# Patient Record
Sex: MALE | Race: BLACK OR AFRICAN AMERICAN | NOT HISPANIC OR LATINO | Employment: FULL TIME | URBAN - METROPOLITAN AREA
[De-identification: names, ages, dates, MRNs, and addresses within clinical notes are randomized per-mention and may not be internally consistent; named-entity substitution may affect disease eponyms.]

---

## 2019-09-25 ENCOUNTER — OFFICE VISIT (OUTPATIENT)
Dept: FAMILY MEDICINE CLINIC | Facility: CLINIC | Age: 44
End: 2019-09-25

## 2019-09-25 VITALS
BODY MASS INDEX: 26.86 KG/M2 | TEMPERATURE: 97.4 F | RESPIRATION RATE: 14 BRPM | WEIGHT: 216 LBS | HEART RATE: 82 BPM | HEIGHT: 75 IN | SYSTOLIC BLOOD PRESSURE: 128 MMHG | OXYGEN SATURATION: 98 % | DIASTOLIC BLOOD PRESSURE: 72 MMHG

## 2019-09-25 DIAGNOSIS — Z00.00 ANNUAL PHYSICAL EXAM: Primary | ICD-10-CM

## 2019-09-25 LAB
BILIRUB BLD-MCNC: NEGATIVE MG/DL
CLARITY, POC: CLEAR
COLOR UR: YELLOW
GLUCOSE UR STRIP-MCNC: NEGATIVE MG/DL
KETONES UR QL: NEGATIVE
LEUKOCYTE EST, POC: NEGATIVE
NITRITE UR-MCNC: NEGATIVE MG/ML
PH UR: 7 [PH] (ref 5–8)
PROT UR STRIP-MCNC: ABNORMAL MG/DL
RBC # UR STRIP: NEGATIVE /UL
SP GR UR: 1.2 (ref 1–1.03)
UROBILINOGEN UR QL: NORMAL

## 2019-09-25 PROCEDURE — 90471 IMMUNIZATION ADMIN: CPT | Performed by: INTERNAL MEDICINE

## 2019-09-25 PROCEDURE — 81003 URINALYSIS AUTO W/O SCOPE: CPT | Performed by: INTERNAL MEDICINE

## 2019-09-25 PROCEDURE — 99396 PREV VISIT EST AGE 40-64: CPT | Performed by: INTERNAL MEDICINE

## 2019-09-25 PROCEDURE — 93000 ELECTROCARDIOGRAM COMPLETE: CPT | Performed by: INTERNAL MEDICINE

## 2019-09-25 PROCEDURE — 90674 CCIIV4 VAC NO PRSV 0.5 ML IM: CPT | Performed by: INTERNAL MEDICINE

## 2019-09-25 RX ORDER — MONTELUKAST SODIUM 10 MG/1
TABLET ORAL
Refills: 2 | COMMUNITY
Start: 2019-09-11 | End: 2021-08-10

## 2019-09-25 RX ORDER — ALBUTEROL SULFATE 90 UG/1
AEROSOL, METERED RESPIRATORY (INHALATION)
Refills: 0 | COMMUNITY
Start: 2019-09-12 | End: 2021-08-10

## 2019-09-25 NOTE — PROGRESS NOTES
BÁRBARAStar Messina is a 44 y.o. male.     Chief Complaint   Patient presents with   • Annual Exam   • Hypertension       History of Present Illness   Patient here for annual physical.  Not on blood pressure medications.  Blood pressure has been under control.  He had asthma but under control not taking any medication except for albuterol as needed.  He had nasal polyps had surgeries.  No chest pain shortness of breath.  No additional family problems.  No cancer runs in the family.  No melena, hematemesis hematochezia.  No particular reason that he wanted to get a physical done.  The following portions of the patient's history were reviewed and updated as appropriate: allergies, current medications, past family history, past medical history, past social history, past surgical history and problem list.    Review of Systems   Constitutional: Negative.    HENT: Negative.    Eyes: Negative.    Respiratory: Negative.    Cardiovascular: Negative.    Gastrointestinal: Negative.    Endocrine: Negative.    Genitourinary: Negative.    Musculoskeletal: Negative.    Skin: Negative.    Allergic/Immunologic: Negative.    Neurological: Negative.    Hematological: Negative.    Psychiatric/Behavioral: Negative.        No Known Allergies    Current Outpatient Medications on File Prior to Visit   Medication Sig Dispense Refill   • albuterol sulfate  (90 Base) MCG/ACT inhaler INL TWO PFS PO Q 6 H PRF WHZ  0   • BREO ELLIPTA 100-25 MCG/INH inhaler INL ONE PUFF QD  0   • montelukast (SINGULAIR) 10 MG tablet TK ONE T PO ATN  2     No current facility-administered medications on file prior to visit.        History reviewed. No pertinent family history.    Past Medical History:   Diagnosis Date   • Asthma        Past Surgical History:   Procedure Laterality Date   • SINUS SURGERY         Social History     Socioeconomic History   • Marital status:      Spouse name: Not on file   • Number of children: Not on file   •  Years of education: Not on file   • Highest education level: Not on file   Tobacco Use   • Smoking status: Never Smoker   • Smokeless tobacco: Never Used   Substance and Sexual Activity   • Alcohol use: No     Frequency: Never   • Drug use: No   • Sexual activity: Defer       There is no problem list on file for this patient.      Vitals:    09/25/19 0939   BP: 128/72   Pulse: 82   Resp: 14   Temp: 97.4 °F (36.3 °C)   SpO2: 98%       Objective   Physical Exam   Constitutional: He is oriented to person, place, and time. He appears well-developed and well-nourished. No distress.   HENT:   Head: Normocephalic and atraumatic.   Right Ear: External ear normal.   Left Ear: External ear normal.   Mouth/Throat: Oropharynx is clear and moist.   Eyes: Conjunctivae and EOM are normal. Pupils are equal, round, and reactive to light.   Neck: Normal range of motion. Neck supple. No JVD present. No tracheal deviation present. No thyromegaly present.   Cardiovascular: Normal rate, regular rhythm, normal heart sounds and intact distal pulses. Exam reveals no gallop and no friction rub.   No murmur heard.  Pulmonary/Chest: Effort normal and breath sounds normal. No stridor. No respiratory distress. He has no wheezes. He has no rales. He exhibits no tenderness.   Abdominal: Soft. Bowel sounds are normal. He exhibits no distension and no mass. There is no tenderness. There is no rebound and no guarding. No hernia.   Genitourinary: Rectum normal, prostate normal and penis normal. Rectal exam shows guaiac negative stool.   Genitourinary Comments: Prostate was nontender, not enlarged.  Heme-negative.  No palpable inguinal hernia or testicular masses.   Musculoskeletal: Normal range of motion. He exhibits no edema, tenderness or deformity.   Lymphadenopathy:     He has no cervical adenopathy.   Neurological: He is alert and oriented to person, place, and time. He displays normal reflexes. No cranial nerve deficit or sensory deficit. He  exhibits normal muscle tone. Coordination normal.   Cranial nerves II through XII grossly intact, DTR 2+, motor 5/5   Skin: Skin is warm and dry. No rash noted. He is not diaphoretic. No erythema. No pallor.   Psychiatric: He has a normal mood and affect. His behavior is normal. Judgment and thought content normal.   Nursing note and vitals reviewed.        Assessment/Plan   Hope was seen today for annual exam and hypertension.    Diagnoses and all orders for this visit:    Annual physical exam  -     CBC & Differential  -     Comprehensive Metabolic Panel  -     Lipid Panel With / Chol / HDL Ratio  -     TSH  -     PSA Screen  -     ECG 12 Lead  -     POC Urinalysis Dipstick, Automated    Other orders  -     Flucelvax Quad=>4Years ([id number])    Continue diet and exercise.  Continue checking blood pressure.  EKG was normal.  Was normal sinus rhythm no ST elevation depression.  No arrhythmia found.  Normal EKG.  His urine is also normal.  Return as needed.

## 2019-09-26 LAB
ALBUMIN SERPL-MCNC: 4.6 G/DL (ref 3.5–5.2)
ALBUMIN/GLOB SERPL: 1.2 G/DL
ALP SERPL-CCNC: 66 U/L (ref 39–117)
ALT SERPL-CCNC: 21 U/L (ref 1–41)
AST SERPL-CCNC: 18 U/L (ref 1–40)
BASOPHILS # BLD AUTO: 0.09 10*3/MM3 (ref 0–0.2)
BASOPHILS NFR BLD AUTO: 1.6 % (ref 0–1.5)
BILIRUB SERPL-MCNC: 0.6 MG/DL (ref 0.2–1.2)
BUN SERPL-MCNC: 12 MG/DL (ref 6–20)
BUN/CREAT SERPL: 12.1 (ref 7–25)
CALCIUM SERPL-MCNC: 9.6 MG/DL (ref 8.6–10.5)
CHLORIDE SERPL-SCNC: 101 MMOL/L (ref 98–107)
CHOLEST SERPL-MCNC: 193 MG/DL (ref 0–200)
CHOLEST/HDLC SERPL: 4.95 {RATIO}
CO2 SERPL-SCNC: 23.6 MMOL/L (ref 22–29)
CREAT SERPL-MCNC: 0.99 MG/DL (ref 0.76–1.27)
EOSINOPHIL # BLD AUTO: 0.86 10*3/MM3 (ref 0–0.4)
EOSINOPHIL NFR BLD AUTO: 15 % (ref 0.3–6.2)
ERYTHROCYTE [DISTWIDTH] IN BLOOD BY AUTOMATED COUNT: 13.2 % (ref 12.3–15.4)
GLOBULIN SER CALC-MCNC: 3.8 GM/DL
GLUCOSE SERPL-MCNC: 91 MG/DL (ref 65–99)
HCT VFR BLD AUTO: 51 % (ref 37.5–51)
HDLC SERPL-MCNC: 39 MG/DL (ref 40–60)
HGB BLD-MCNC: 16.1 G/DL (ref 13–17.7)
IMM GRANULOCYTES # BLD AUTO: 0 10*3/MM3 (ref 0–0.05)
IMM GRANULOCYTES NFR BLD AUTO: 0 % (ref 0–0.5)
LDLC SERPL CALC-MCNC: 129 MG/DL (ref 0–100)
LYMPHOCYTES # BLD AUTO: 2.58 10*3/MM3 (ref 0.7–3.1)
LYMPHOCYTES NFR BLD AUTO: 45.1 % (ref 19.6–45.3)
MCH RBC QN AUTO: 27.2 PG (ref 26.6–33)
MCHC RBC AUTO-ENTMCNC: 31.6 G/DL (ref 31.5–35.7)
MCV RBC AUTO: 86.3 FL (ref 79–97)
MONOCYTES # BLD AUTO: 0.49 10*3/MM3 (ref 0.1–0.9)
MONOCYTES NFR BLD AUTO: 8.6 % (ref 5–12)
NEUTROPHILS # BLD AUTO: 1.7 10*3/MM3 (ref 1.7–7)
NEUTROPHILS NFR BLD AUTO: 29.7 % (ref 42.7–76)
NRBC BLD AUTO-RTO: 0 /100 WBC (ref 0–0.2)
PLATELET # BLD AUTO: 372 10*3/MM3 (ref 140–450)
POTASSIUM SERPL-SCNC: 4.3 MMOL/L (ref 3.5–5.2)
PROT SERPL-MCNC: 8.4 G/DL (ref 6–8.5)
PSA SERPL-MCNC: 1.26 NG/ML (ref 0–4)
RBC # BLD AUTO: 5.91 10*6/MM3 (ref 4.14–5.8)
SODIUM SERPL-SCNC: 140 MMOL/L (ref 136–145)
TRIGL SERPL-MCNC: 127 MG/DL (ref 0–150)
TSH SERPL DL<=0.005 MIU/L-ACNC: 0.97 UIU/ML (ref 0.27–4.2)
VLDLC SERPL CALC-MCNC: 25.4 MG/DL
WBC # BLD AUTO: 5.72 10*3/MM3 (ref 3.4–10.8)

## 2020-12-09 DIAGNOSIS — T78.40XA ALLERGY, INITIAL ENCOUNTER: ICD-10-CM

## 2020-12-09 DIAGNOSIS — R09.81 NASAL CONGESTION: ICD-10-CM

## 2020-12-09 DIAGNOSIS — J33.9 NASAL POLYP: Primary | ICD-10-CM

## 2021-08-10 ENCOUNTER — OFFICE VISIT (OUTPATIENT)
Dept: FAMILY MEDICINE CLINIC | Facility: CLINIC | Age: 46
End: 2021-08-10

## 2021-08-10 ENCOUNTER — TELEPHONE (OUTPATIENT)
Dept: GASTROENTEROLOGY | Facility: CLINIC | Age: 46
End: 2021-08-10

## 2021-08-10 VITALS
TEMPERATURE: 98 F | OXYGEN SATURATION: 99 % | BODY MASS INDEX: 27.59 KG/M2 | DIASTOLIC BLOOD PRESSURE: 70 MMHG | WEIGHT: 215 LBS | RESPIRATION RATE: 16 BRPM | HEIGHT: 74 IN | HEART RATE: 74 BPM | SYSTOLIC BLOOD PRESSURE: 118 MMHG

## 2021-08-10 DIAGNOSIS — Z12.11 COLON CANCER SCREENING: Primary | ICD-10-CM

## 2021-08-10 DIAGNOSIS — Z12.5 PROSTATE CANCER SCREENING: ICD-10-CM

## 2021-08-10 DIAGNOSIS — Z00.00 ANNUAL PHYSICAL EXAM: ICD-10-CM

## 2021-08-10 LAB
BILIRUB BLD-MCNC: NEGATIVE MG/DL
CLARITY, POC: CLEAR
COLOR UR: YELLOW
GLUCOSE UR STRIP-MCNC: NEGATIVE MG/DL
KETONES UR QL: NEGATIVE
LEUKOCYTE EST, POC: NEGATIVE
NITRITE UR-MCNC: NEGATIVE MG/ML
PH UR: 7 [PH] (ref 5–8)
PROT UR STRIP-MCNC: NEGATIVE MG/DL
RBC # UR STRIP: NEGATIVE /UL
SP GR UR: 1.01 (ref 1–1.03)
UROBILINOGEN UR QL: NORMAL

## 2021-08-10 PROCEDURE — 93000 ELECTROCARDIOGRAM COMPLETE: CPT | Performed by: INTERNAL MEDICINE

## 2021-08-10 PROCEDURE — 99396 PREV VISIT EST AGE 40-64: CPT | Performed by: INTERNAL MEDICINE

## 2021-08-10 PROCEDURE — 81003 URINALYSIS AUTO W/O SCOPE: CPT | Performed by: INTERNAL MEDICINE

## 2021-08-10 NOTE — PROGRESS NOTES
Shira Messina is a 46 y.o. male.     Chief Complaint   Patient presents with   • Annual Exam     physical        History of Present Illness   Patient here for annual physical.  He is seeing Dr. Brooks for asthma now he is on Advair as insurance not covering for Breo Ellipta.  Denies any chest pain shortness of breath.  No weight loss.  The following portions of the patient's history were reviewed and updated as appropriate: allergies, current medications, past family history, past medical history, past social history, past surgical history and problem list.    Review of Systems   Constitutional: Negative for activity change, appetite change, fatigue and fever.   Eyes: Negative for blurred vision and double vision.   Respiratory: Negative for shortness of breath.    Cardiovascular: Negative for chest pain, palpitations and leg swelling.   Neurological: Negative for dizziness, syncope, light-headedness and headache.   All other systems reviewed and are negative.      No Known Allergies    Current Outpatient Medications on File Prior to Visit   Medication Sig Dispense Refill   • BREO ELLIPTA 100-25 MCG/INH inhaler INL ONE PUFF QD  0   • [DISCONTINUED] albuterol sulfate  (90 Base) MCG/ACT inhaler INL TWO PFS PO Q 6 H PRF WHZ  0   • [DISCONTINUED] montelukast (SINGULAIR) 10 MG tablet TK ONE T PO ATN  2     No current facility-administered medications on file prior to visit.       History reviewed. No pertinent family history.    Past Medical History:   Diagnosis Date   • Asthma        Past Surgical History:   Procedure Laterality Date   • SINUS SURGERY         Social History     Socioeconomic History   • Marital status:      Spouse name: Not on file   • Number of children: Not on file   • Years of education: Not on file   • Highest education level: Not on file   Tobacco Use   • Smoking status: Never Smoker   • Smokeless tobacco: Never Used   Substance and Sexual Activity   • Alcohol use: No   •  "Drug use: No   • Sexual activity: Defer       Patient Active Problem List   Diagnosis   • Asthma with acute exacerbation       /70   Pulse 74   Temp 98 °F (36.7 °C)   Resp 16   Ht 188 cm (74\")   Wt 97.5 kg (215 lb)   SpO2 99%   BMI 27.60 kg/m²   Body mass index is 27.6 kg/m².    Objective   Physical Exam  Constitutional:       General: He is not in acute distress.     Appearance: He is well-developed. He is not diaphoretic.   HENT:      Head: Normocephalic and atraumatic.      Right Ear: External ear normal.      Left Ear: External ear normal.   Eyes:      Conjunctiva/sclera: Conjunctivae normal.      Pupils: Pupils are equal, round, and reactive to light.   Neck:      Thyroid: No thyromegaly.      Vascular: No JVD.      Trachea: No tracheal deviation.   Cardiovascular:      Rate and Rhythm: Normal rate and regular rhythm.      Heart sounds: Normal heart sounds. No murmur heard.   No friction rub. No gallop.    Pulmonary:      Effort: Pulmonary effort is normal. No respiratory distress.      Breath sounds: Normal breath sounds. No stridor. No wheezing or rales.   Chest:      Chest wall: No tenderness.   Abdominal:      General: Bowel sounds are normal. There is no distension.      Palpations: Abdomen is soft. There is no mass.      Tenderness: There is no abdominal tenderness. There is no guarding or rebound.      Hernia: No hernia is present.   Genitourinary:     Penis: Normal.       Testes: Normal.      Prostate: Normal.      Rectum: Guaiac result negative.   Musculoskeletal:         General: No tenderness or deformity. Normal range of motion.      Cervical back: Normal range of motion and neck supple.   Lymphadenopathy:      Cervical: No cervical adenopathy.   Skin:     General: Skin is warm and dry.      Coloration: Skin is not pale.      Findings: No erythema or rash.   Neurological:      General: No focal deficit present.      Mental Status: He is alert and oriented to person, place, and time. " Mental status is at baseline.      Cranial Nerves: No cranial nerve deficit.      Sensory: No sensory deficit.      Motor: No weakness.      Coordination: Coordination normal.      Gait: Gait normal.      Deep Tendon Reflexes: Reflexes normal.      Comments: Cranial nerves II through XII grossly intact, DTR 2+, motor 5/5   Psychiatric:         Mood and Affect: Mood normal.         Behavior: Behavior normal.         Thought Content: Thought content normal.         Judgment: Judgment normal.           Assessment/Plan   Diagnoses and all orders for this visit:    1. Colon cancer screening (Primary)  -     Ambulatory Referral For Screening Colonoscopy  -     Comprehensive Metabolic Panel  -     CBC & Differential  -     Lipid Panel With / Chol / HDL Ratio  -     TSH  -     POC Urinalysis Dipstick, Automated  -     ECG 12 Lead    2. Annual physical exam  -     Comprehensive Metabolic Panel  -     CBC & Differential  -     Lipid Panel With / Chol / HDL Ratio  -     TSH  -     POC Urinalysis Dipstick, Automated  -     ECG 12 Lead  -     PSA Screen  -     Hepatitis C Antibody    3. Prostate cancer screening  -     PSA Screen    Discussed with patient diet and exercise.  Check blood pressure at home.  Colonoscopy.  Recommend getting routine vaccinations for shingles, tetanus.  His EKG is normal sinus rhythm no ST elevation depression, normal EKG.  Awaiting labs.  Patient to return as needed.

## 2021-08-11 LAB
ALBUMIN SERPL-MCNC: 4.5 G/DL (ref 3.5–5.2)
ALBUMIN/GLOB SERPL: 1.3 G/DL
ALP SERPL-CCNC: 66 U/L (ref 39–117)
ALT SERPL-CCNC: 22 U/L (ref 1–41)
AST SERPL-CCNC: 21 U/L (ref 1–40)
BASOPHILS # BLD AUTO: 0.08 10*3/MM3 (ref 0–0.2)
BASOPHILS NFR BLD AUTO: 1.4 % (ref 0–1.5)
BILIRUB SERPL-MCNC: 0.6 MG/DL (ref 0–1.2)
BUN SERPL-MCNC: 9 MG/DL (ref 6–20)
BUN/CREAT SERPL: 11 (ref 7–25)
CALCIUM SERPL-MCNC: 9.5 MG/DL (ref 8.6–10.5)
CHLORIDE SERPL-SCNC: 101 MMOL/L (ref 98–107)
CHOLEST SERPL-MCNC: 160 MG/DL (ref 0–200)
CHOLEST/HDLC SERPL: 4.21 {RATIO}
CO2 SERPL-SCNC: 24 MMOL/L (ref 22–29)
CREAT SERPL-MCNC: 0.82 MG/DL (ref 0.76–1.27)
EOSINOPHIL # BLD AUTO: 0.8 10*3/MM3 (ref 0–0.4)
EOSINOPHIL NFR BLD AUTO: 13.8 % (ref 0.3–6.2)
ERYTHROCYTE [DISTWIDTH] IN BLOOD BY AUTOMATED COUNT: 12.9 % (ref 12.3–15.4)
GLOBULIN SER CALC-MCNC: 3.5 GM/DL
GLUCOSE SERPL-MCNC: 86 MG/DL (ref 65–99)
HCT VFR BLD AUTO: 47.4 % (ref 37.5–51)
HCV AB S/CO SERPL IA: <0.1 S/CO RATIO (ref 0–0.9)
HDLC SERPL-MCNC: 38 MG/DL (ref 40–60)
HGB BLD-MCNC: 15.5 G/DL (ref 13–17.7)
IMM GRANULOCYTES # BLD AUTO: 0 10*3/MM3 (ref 0–0.05)
IMM GRANULOCYTES NFR BLD AUTO: 0 % (ref 0–0.5)
LDLC SERPL CALC-MCNC: 100 MG/DL (ref 0–100)
LYMPHOCYTES # BLD AUTO: 2.92 10*3/MM3 (ref 0.7–3.1)
LYMPHOCYTES NFR BLD AUTO: 50.3 % (ref 19.6–45.3)
MCH RBC QN AUTO: 28.3 PG (ref 26.6–33)
MCHC RBC AUTO-ENTMCNC: 32.7 G/DL (ref 31.5–35.7)
MCV RBC AUTO: 86.7 FL (ref 79–97)
MONOCYTES # BLD AUTO: 0.58 10*3/MM3 (ref 0.1–0.9)
MONOCYTES NFR BLD AUTO: 10 % (ref 5–12)
NEUTROPHILS # BLD AUTO: 1.43 10*3/MM3 (ref 1.7–7)
NEUTROPHILS NFR BLD AUTO: 24.5 % (ref 42.7–76)
NRBC BLD AUTO-RTO: 0 /100 WBC (ref 0–0.2)
PLATELET # BLD AUTO: 372 10*3/MM3 (ref 140–450)
POTASSIUM SERPL-SCNC: 4.3 MMOL/L (ref 3.5–5.2)
PROT SERPL-MCNC: 8 G/DL (ref 6–8.5)
PSA SERPL-MCNC: 0.95 NG/ML (ref 0–4)
RBC # BLD AUTO: 5.47 10*6/MM3 (ref 4.14–5.8)
SODIUM SERPL-SCNC: 137 MMOL/L (ref 136–145)
TRIGL SERPL-MCNC: 121 MG/DL (ref 0–150)
TSH SERPL DL<=0.005 MIU/L-ACNC: 0.98 UIU/ML (ref 0.27–4.2)
VLDLC SERPL CALC-MCNC: 22 MG/DL (ref 5–40)
WBC # BLD AUTO: 5.81 10*3/MM3 (ref 3.4–10.8)

## 2021-12-17 ENCOUNTER — IMMUNIZATION (OUTPATIENT)
Dept: VACCINE CLINIC | Facility: HOSPITAL | Age: 46
End: 2021-12-17

## 2021-12-17 PROCEDURE — 91300 HC SARSCOV02 VAC 30MCG/0.3ML IM: CPT | Performed by: INTERNAL MEDICINE

## 2021-12-17 PROCEDURE — 0004A HC ADM SARSCOV2 30MCG/0.3ML BOOSTER: CPT | Performed by: INTERNAL MEDICINE

## 2023-08-01 ENCOUNTER — TELEPHONE (OUTPATIENT)
Dept: GASTROENTEROLOGY | Facility: CLINIC | Age: 48
End: 2023-08-01
Payer: MEDICAID

## 2023-08-01 ENCOUNTER — HOSPITAL ENCOUNTER (OUTPATIENT)
Dept: CT IMAGING | Facility: HOSPITAL | Age: 48
Discharge: HOME | End: 2023-08-01
Admitting: INTERNAL MEDICINE
Payer: MEDICAID

## 2023-08-01 DIAGNOSIS — R31.9 HEMATURIA, UNSPECIFIED TYPE: ICD-10-CM

## 2023-08-01 PROCEDURE — 74176 CT ABD & PELVIS W/O CONTRAST: CPT

## 2023-08-07 ENCOUNTER — OFFICE VISIT (OUTPATIENT)
Dept: FAMILY MEDICINE CLINIC | Facility: CLINIC | Age: 48
End: 2023-08-07
Payer: MEDICAID

## 2023-08-07 VITALS
HEIGHT: 74 IN | HEART RATE: 69 BPM | DIASTOLIC BLOOD PRESSURE: 84 MMHG | BODY MASS INDEX: 28.23 KG/M2 | OXYGEN SATURATION: 97 % | RESPIRATION RATE: 16 BRPM | TEMPERATURE: 98 F | WEIGHT: 220 LBS | SYSTOLIC BLOOD PRESSURE: 124 MMHG

## 2023-08-07 DIAGNOSIS — R31.9 HEMATURIA, UNSPECIFIED TYPE: Primary | ICD-10-CM

## 2023-08-07 PROBLEM — J45.901 MODERATE ASTHMA WITH ACUTE EXACERBATION: Status: ACTIVE | Noted: 2023-08-07

## 2023-08-07 LAB
BILIRUB BLD-MCNC: NEGATIVE MG/DL
CLARITY, POC: CLEAR
COLOR UR: YELLOW
EXPIRATION DATE: ABNORMAL
GLUCOSE UR STRIP-MCNC: NEGATIVE MG/DL
KETONES UR QL: NEGATIVE
LEUKOCYTE EST, POC: NEGATIVE
Lab: ABNORMAL
NITRITE UR-MCNC: NEGATIVE MG/ML
PH UR: 7 [PH] (ref 5–8)
PROT UR STRIP-MCNC: ABNORMAL MG/DL
RBC # UR STRIP: ABNORMAL /UL
SP GR UR: 1.01 (ref 1–1.03)
UROBILINOGEN UR QL: ABNORMAL

## 2023-08-07 NOTE — PROGRESS NOTES
Shira Messina is a 48 y.o. male.     Chief Complaint   Patient presents with    Follow-up     Hematuria  History of Present Illness   No chest pain shortness of breath.  No abdominal pain.  Follow-up for hematuria.  No dysuria.  The following portions of the patient's history were reviewed and updated as appropriate: allergies, current medications, past family history, past medical history, past social history, past surgical history and problem list.    Review of Systems   Constitutional:  Negative for activity change, appetite change, fatigue and fever.   Eyes:  Negative for blurred vision and double vision.   Respiratory: Negative.  Negative for shortness of breath.    Cardiovascular:  Negative for chest pain, palpitations and leg swelling.   Gastrointestinal: Negative.    Genitourinary:  Negative for dysuria, flank pain, frequency, hematuria and nocturia.   Neurological:  Negative for dizziness, syncope, light-headedness and headache.     No Known Allergies    Current Outpatient Medications on File Prior to Visit   Medication Sig Dispense Refill    BREO ELLIPTA 100-25 MCG/INH inhaler   0    Dupixent 300 MG/2ML solution prefilled syringe       fluticasone (FLONASE) 50 MCG/ACT nasal spray       fluticasone-salmeterol (ADVAIR HFA) 45-21 MCG/ACT inhaler Inhale 2 puffs 2 (Two) Times a Day.       No current facility-administered medications on file prior to visit.       History reviewed. No pertinent family history.    Past Medical History:   Diagnosis Date    Asthma        Past Surgical History:   Procedure Laterality Date    SINUS SURGERY         Social History     Socioeconomic History    Marital status:    Tobacco Use    Smoking status: Never    Smokeless tobacco: Never   Substance and Sexual Activity    Alcohol use: No    Drug use: No    Sexual activity: Defer       Patient Active Problem List   Diagnosis    Moderate asthma with acute exacerbation       /84 (BP Location: Left arm, Patient  "Position: Sitting, Cuff Size: Adult)   Pulse 69   Temp 98 øF (36.7 øC) (Temporal)   Resp 16   Ht 188 cm (74.02\")   Wt 99.8 kg (220 lb)   SpO2 97%   BMI 28.23 kg/mý   Body mass index is 28.23 kg/mý.    Objective   Physical Exam  Vitals and nursing note reviewed.   Constitutional:       Appearance: He is well-developed.   Neck:      Thyroid: No thyromegaly.      Vascular: No JVD.      Trachea: No tracheal deviation.   Cardiovascular:      Rate and Rhythm: Normal rate and regular rhythm.      Heart sounds: Normal heart sounds. No murmur heard.    No friction rub. No gallop.   Pulmonary:      Effort: Pulmonary effort is normal. No respiratory distress.      Breath sounds: Normal breath sounds. No wheezing.   Abdominal:      General: Bowel sounds are normal. There is no distension.      Palpations: Abdomen is soft. There is no mass.      Tenderness: There is no abdominal tenderness. There is no guarding or rebound.      Hernia: No hernia is present.   Musculoskeletal:         General: No swelling or tenderness. Normal range of motion.      Cervical back: Normal range of motion and neck supple.   Lymphadenopathy:      Cervical: No cervical adenopathy.   Neurological:      Mental Status: He is alert.         Assessment & Plan   Diagnoses and all orders for this visit:    1. Hematuria, unspecified type (Primary)  -     POC Urinalysis Dipstick, Automated    US showed no blood at this time.  CT of the abdomen pelvis normal, urine culture been negative.  Discussed with patient he has no symptoms, UA is normal at this time.  We will recheck in 3 months if urine has any blood we will refer to urology.  Regarding his off-and-on joint pains he relates to Dupixent he will check with his allergist.  I will see him back in 3 months.  EHR dragon/transcription disclaimer:  Part of this note are created by electronic transcription/translation of spoken language to printed text and thus may lead to erroneous, or at times, " nonsensical words or phrases inadvertently transcribed.  Although I have reviewed for such errors, some may still exist.

## 2023-09-15 DIAGNOSIS — Z12.11 ENCOUNTER FOR SCREENING FOR MALIGNANT NEOPLASM OF COLON: Primary | ICD-10-CM

## 2023-10-04 PROBLEM — Z12.11 ENCOUNTER FOR SCREENING FOR MALIGNANT NEOPLASM OF COLON: Status: ACTIVE | Noted: 2023-10-04

## 2023-10-06 ENCOUNTER — ANESTHESIA EVENT (OUTPATIENT)
Dept: PERIOP | Facility: HOSPITAL | Age: 48
End: 2023-10-06

## 2023-10-09 ENCOUNTER — HOSPITAL ENCOUNTER (OUTPATIENT)
Facility: HOSPITAL | Age: 48
Setting detail: HOSPITAL OUTPATIENT SURGERY
Discharge: HOME | End: 2023-10-09
Attending: STUDENT IN AN ORGANIZED HEALTH CARE EDUCATION/TRAINING PROGRAM | Admitting: NURSE ANESTHETIST, CERTIFIED REGISTERED
Payer: MEDICAID

## 2023-10-09 ENCOUNTER — ANESTHESIA (OUTPATIENT)
Dept: PERIOP | Facility: HOSPITAL | Age: 48
End: 2023-10-09

## 2023-10-09 VITALS — TEMPERATURE: 97.7 F | BODY MASS INDEX: 28.7 KG/M2 | WEIGHT: 223.6 LBS

## 2023-10-09 PROCEDURE — G0463 HOSPITAL OUTPT CLINIC VISIT: HCPCS | Performed by: STUDENT IN AN ORGANIZED HEALTH CARE EDUCATION/TRAINING PROGRAM

## 2023-10-09 RX ORDER — SODIUM CHLORIDE, SODIUM LACTATE, POTASSIUM CHLORIDE, CALCIUM CHLORIDE 600; 310; 30; 20 MG/100ML; MG/100ML; MG/100ML; MG/100ML
9 INJECTION, SOLUTION INTRAVENOUS CONTINUOUS PRN
Status: DISCONTINUED | OUTPATIENT
Start: 2023-10-09 | End: 2023-10-09 | Stop reason: HOSPADM

## 2023-10-09 RX ORDER — SODIUM CHLORIDE 0.9 % (FLUSH) 0.9 %
10 SYRINGE (ML) INJECTION EVERY 12 HOURS SCHEDULED
Status: DISCONTINUED | OUTPATIENT
Start: 2023-10-09 | End: 2023-10-09 | Stop reason: HOSPADM

## 2023-10-09 RX ORDER — SODIUM CHLORIDE 9 MG/ML
40 INJECTION, SOLUTION INTRAVENOUS AS NEEDED
Status: DISCONTINUED | OUTPATIENT
Start: 2023-10-09 | End: 2023-10-09 | Stop reason: HOSPADM

## 2023-10-09 RX ORDER — SODIUM CHLORIDE 0.9 % (FLUSH) 0.9 %
10 SYRINGE (ML) INJECTION AS NEEDED
Status: DISCONTINUED | OUTPATIENT
Start: 2023-10-09 | End: 2023-10-09 | Stop reason: HOSPADM

## 2023-10-09 NOTE — NURSING NOTE
Patient cancelled due to not prepping for procedure. Misunderstanding about prep  Dr. Yeboah here to speak to patient.

## 2023-10-10 DIAGNOSIS — Z12.11 ENCOUNTER FOR SCREENING FOR MALIGNANT NEOPLASM OF COLON: Primary | ICD-10-CM

## 2023-11-01 ENCOUNTER — ANESTHESIA EVENT (OUTPATIENT)
Dept: PERIOP | Facility: HOSPITAL | Age: 48
End: 2023-11-01
Payer: MEDICAID

## 2023-11-02 ENCOUNTER — ANESTHESIA (OUTPATIENT)
Dept: PERIOP | Facility: HOSPITAL | Age: 48
End: 2023-11-02
Payer: MEDICAID

## 2023-11-02 ENCOUNTER — HOSPITAL ENCOUNTER (OUTPATIENT)
Facility: HOSPITAL | Age: 48
Setting detail: HOSPITAL OUTPATIENT SURGERY
Discharge: HOME | End: 2023-11-02
Attending: STUDENT IN AN ORGANIZED HEALTH CARE EDUCATION/TRAINING PROGRAM | Admitting: STUDENT IN AN ORGANIZED HEALTH CARE EDUCATION/TRAINING PROGRAM
Payer: MEDICAID

## 2023-11-02 VITALS
TEMPERATURE: 97.5 F | WEIGHT: 223.8 LBS | OXYGEN SATURATION: 98 % | RESPIRATION RATE: 17 BRPM | DIASTOLIC BLOOD PRESSURE: 82 MMHG | SYSTOLIC BLOOD PRESSURE: 106 MMHG | HEART RATE: 70 BPM | BODY MASS INDEX: 28.72 KG/M2

## 2023-11-02 DIAGNOSIS — Z12.11 ENCOUNTER FOR SCREENING FOR MALIGNANT NEOPLASM OF COLON: ICD-10-CM

## 2023-11-02 PROCEDURE — 25010000002 PROPOFOL 200 MG/20ML EMULSION: Performed by: ANESTHESIOLOGY

## 2023-11-02 PROCEDURE — 25810000003 LACTATED RINGERS PER 1000 ML: Performed by: NURSE ANESTHETIST, CERTIFIED REGISTERED

## 2023-11-02 PROCEDURE — 45378 DIAGNOSTIC COLONOSCOPY: CPT | Performed by: STUDENT IN AN ORGANIZED HEALTH CARE EDUCATION/TRAINING PROGRAM

## 2023-11-02 RX ORDER — LIDOCAINE HYDROCHLORIDE 20 MG/ML
INJECTION, SOLUTION INFILTRATION; PERINEURAL AS NEEDED
Status: DISCONTINUED | OUTPATIENT
Start: 2023-11-02 | End: 2023-11-02 | Stop reason: SURG

## 2023-11-02 RX ORDER — PROPOFOL 10 MG/ML
INJECTION, EMULSION INTRAVENOUS AS NEEDED
Status: DISCONTINUED | OUTPATIENT
Start: 2023-11-02 | End: 2023-11-02 | Stop reason: SURG

## 2023-11-02 RX ORDER — SODIUM CHLORIDE, SODIUM LACTATE, POTASSIUM CHLORIDE, CALCIUM CHLORIDE 600; 310; 30; 20 MG/100ML; MG/100ML; MG/100ML; MG/100ML
9 INJECTION, SOLUTION INTRAVENOUS CONTINUOUS PRN
Status: DISCONTINUED | OUTPATIENT
Start: 2023-11-02 | End: 2023-11-02 | Stop reason: HOSPADM

## 2023-11-02 RX ORDER — SODIUM CHLORIDE 0.9 % (FLUSH) 0.9 %
10 SYRINGE (ML) INJECTION AS NEEDED
Status: DISCONTINUED | OUTPATIENT
Start: 2023-11-02 | End: 2023-11-02 | Stop reason: HOSPADM

## 2023-11-02 RX ORDER — SODIUM CHLORIDE 0.9 % (FLUSH) 0.9 %
10 SYRINGE (ML) INJECTION EVERY 12 HOURS SCHEDULED
Status: DISCONTINUED | OUTPATIENT
Start: 2023-11-02 | End: 2023-11-02 | Stop reason: HOSPADM

## 2023-11-02 RX ORDER — MAGNESIUM HYDROXIDE 1200 MG/15ML
LIQUID ORAL AS NEEDED
Status: DISCONTINUED | OUTPATIENT
Start: 2023-11-02 | End: 2023-11-02 | Stop reason: HOSPADM

## 2023-11-02 RX ORDER — SODIUM CHLORIDE 9 MG/ML
40 INJECTION, SOLUTION INTRAVENOUS AS NEEDED
Status: DISCONTINUED | OUTPATIENT
Start: 2023-11-02 | End: 2023-11-02 | Stop reason: HOSPADM

## 2023-11-02 RX ADMIN — LIDOCAINE HYDROCHLORIDE 100 MG: 20 INJECTION, SOLUTION INFILTRATION; PERINEURAL at 08:07

## 2023-11-02 RX ADMIN — SODIUM CHLORIDE, POTASSIUM CHLORIDE, SODIUM LACTATE AND CALCIUM CHLORIDE 9 ML/HR: 600; 310; 30; 20 INJECTION, SOLUTION INTRAVENOUS at 07:40

## 2023-11-02 RX ADMIN — PROPOFOL INJECTABLE EMULSION 250 MG: 10 INJECTION, EMULSION INTRAVENOUS at 08:07

## 2023-11-02 NOTE — ANESTHESIA PREPROCEDURE EVALUATION
Anesthesia Evaluation     no history of anesthetic complications:   NPO Solid Status: > 8 hours  NPO Liquid Status: > 8 hours           Airway   Mallampati: III  TM distance: >3 FB  Neck ROM: full  No difficulty expected  Dental - normal exam     Comment: Crown on bottom right in back (fixed)    Pulmonary - normal exam   (+) asthma (No inhaler use for 6 months),  Cardiovascular - negative cardio ROS and normal exam        Neuro/Psych  GI/Hepatic/Renal/Endo - negative ROS     Musculoskeletal (-) negative ROS    Abdominal    Substance History      OB/GYN          Other - negative ROS                         Anesthesia Plan    ASA 2     MAC     intravenous induction     Anesthetic plan, risks, benefits, and alternatives have been provided, discussed and informed consent has been obtained with: patient.  Pre-procedure education provided  Plan discussed with CRNA.        CODE STATUS:

## 2023-11-02 NOTE — ANESTHESIA POSTPROCEDURE EVALUATION
Patient: Hope Messina    Procedure Summary       Date: 11/02/23 Room / Location: [facility] ENDOSCOPY 1 / [facility] OR    Anesthesia Start: 0801 Anesthesia Stop: 0824    Procedure: COLONOSCOPY Diagnosis:       Encounter for screening for malignant neoplasm of colon      (Encounter for screening for malignant neoplasm of colon [Z12.11])    Surgeons: Abdoul Yeboah MD Provider: Tara He MD    Anesthesia Type: MAC ASA Status: 2            Anesthesia Type: MAC    Vitals  Vitals Value Taken Time   /74 11/02/23 0856   Temp 97.5 °F (36.4 °C) 11/02/23 0840   Pulse 76 11/02/23 0900   Resp 15 11/02/23 0840   SpO2 96 % 11/02/23 0900   Vitals shown include unfiled device data.        Post Anesthesia Care and Evaluation    Patient location during evaluation: PHASE II  Patient participation: complete - patient participated  Level of consciousness: awake and alert  Pain score: 0  Pain management: adequate    Airway patency: patent  Anesthetic complications: No anesthetic complications  PONV Status: none  Cardiovascular status: acceptable  Respiratory status: acceptable  Hydration status: acceptable

## 2023-11-02 NOTE — H&P
Patient Care Team:  Niya Marcum MD as PCP - General (Internal Medicine)    CHIEF COMPLAINT: Screening CRC    HISTORY OF PRESENT ILLNESS:  For average risk screening (1st c/s).     Past Medical History:   Diagnosis Date    Asthma      Past Surgical History:   Procedure Laterality Date    HAMMER TOE REPAIR      SINUS SURGERY       Family History   Problem Relation Age of Onset    Malig Hyperthermia Neg Hx      Social History     Tobacco Use    Smoking status: Never    Smokeless tobacco: Never   Vaping Use    Vaping Use: Never used   Substance Use Topics    Alcohol use: No    Drug use: No     Medications Prior to Admission   Medication Sig Dispense Refill Last Dose    Dupixent 300 MG/2ML solution prefilled syringe    Past Month    fluticasone (FLONASE) 50 MCG/ACT nasal spray    Past Month    fluticasone-salmeterol (ADVAIR HFA) 45-21 MCG/ACT inhaler Inhale 2 puffs 2 (Two) Times a Day.   More than a month     Allergies:  Patient has no known allergies.    REVIEW OF SYSTEMS:  Please see the above history of present illness for pertinent positives and negatives.  The remainder of the patient's systems have been reviewed and are negative.     Vital Signs  Temp:  [97.8 °F (36.6 °C)] 97.8 °F (36.6 °C)  Heart Rate:  [71] 71  Resp:  [10] 10  BP: (123)/(75) 123/75    Flowsheet Rows      Flowsheet Row First Filed Value   Admission Height --   Admission Weight 102 kg (223 lb 12.8 oz) Documented at 11/02/2023 0727             Physical Exam:  Physical Exam   Constitutional: Patient appears well-developed and well-nourished and in no acute distress   HEENT:   Head: Normocephalic and atraumatic.   Eyes:  Pupils are equal, round, and reactive to light. EOM are intact. Sclerae are anicteric and non-injected.  Mouth and Throat: Patient has moist mucous membranes. Oropharynx is clear of any erythema or exudate.     Neck: Neck supple. No JVD present. No thyromegaly present. No lymphadenopathy present.  Cardiovascular: Regular rate,  regular rhythm, S1 normal and S2 normal.  Exam reveals no gallop and no friction rub.  No murmur heard.  Pulmonary/Chest: Lungs are clear to auscultation bilaterally. No respiratory distress. No wheezes. No rhonchi. No rales.   Abdominal: Soft. Bowel sounds are normal. No distension and no mass. There is no hepatosplenomegaly. There is no tenderness.   Musculoskeletal: Normal Muscle tone  Extremities: No edema. Pulses are palpable in all 4 extremities.  Neurological: Patient is alert and oriented to person, place, and time. Cranial nerves II-XII are grossly intact with no focal deficits.  Skin: Skin is warm. No rash noted. Nails show no clubbing.  No cyanosis or erythema.    Debilities/Disabilities Identified: None  Emotional Behavior: Appropriate     Results Review:   I reviewed the patient's new clinical results.    Lab Results (most recent)       None            Imaging Results (Most Recent)       None          reviewed    ECG/EMG Results (most recent)       None          reviewed    Assessment & Plan   Screening CRC/  colonoscopy      I discussed the patient's findings and my recommendations with patient.     Abdoul Yeboah MD  11/02/23  08:08 EDT    Time: 10 min prior to procedure.

## 2023-11-13 ENCOUNTER — OFFICE VISIT (OUTPATIENT)
Dept: FAMILY MEDICINE CLINIC | Facility: CLINIC | Age: 48
End: 2023-11-13
Payer: MEDICAID

## 2023-11-13 VITALS
RESPIRATION RATE: 17 BRPM | WEIGHT: 230 LBS | HEART RATE: 95 BPM | HEIGHT: 74 IN | OXYGEN SATURATION: 97 % | TEMPERATURE: 98.4 F | DIASTOLIC BLOOD PRESSURE: 64 MMHG | SYSTOLIC BLOOD PRESSURE: 118 MMHG | BODY MASS INDEX: 29.52 KG/M2

## 2023-11-13 DIAGNOSIS — J45.901 MODERATE ASTHMA WITH ACUTE EXACERBATION, UNSPECIFIED WHETHER PERSISTENT: ICD-10-CM

## 2023-11-13 DIAGNOSIS — R31.9 HEMATURIA, UNSPECIFIED TYPE: Primary | ICD-10-CM

## 2023-11-13 LAB
BILIRUB BLD-MCNC: NEGATIVE MG/DL
CLARITY, POC: CLEAR
COLOR UR: YELLOW
EXPIRATION DATE: ABNORMAL
GLUCOSE UR STRIP-MCNC: NEGATIVE MG/DL
KETONES UR QL: NEGATIVE
LEUKOCYTE EST, POC: NEGATIVE
Lab: ABNORMAL
NITRITE UR-MCNC: NEGATIVE MG/ML
PH UR: 5.5 [PH] (ref 5–8)
PROT UR STRIP-MCNC: ABNORMAL MG/DL
RBC # UR STRIP: NEGATIVE /UL
SP GR UR: 1.03 (ref 1–1.03)
UROBILINOGEN UR QL: NORMAL

## 2023-11-13 RX ORDER — FLUTICASONE PROPIONATE AND SALMETEROL 50; 250 UG/1; UG/1
POWDER RESPIRATORY (INHALATION)
COMMUNITY
Start: 2023-11-03

## 2023-11-13 NOTE — PROGRESS NOTES
Shira Messina is a 48 y.o. male.     Chief Complaint   Patient presents with    Asthma   Hematuria    Asthma  There is no shortness of breath. Pertinent negatives include no appetite change, chest pain or fever. His past medical history is significant for asthma.   Patient here also follow-up for hematuria.  He has no dysuria, urgency, visible blood.  He had a CT abdomen pelvis without contrast that was normal.  UA today was normal.  Patient is aware if has recurrent problems he may have to be seen by a urologist.    He sees I believe pulmonologist for his asthma.    The following portions of the patient's history were reviewed and updated as appropriate: allergies, current medications, past family history, past medical history, past social history, past surgical history and problem list.    Review of Systems   Constitutional:  Negative for activity change, appetite change, fatigue and fever.   Eyes:  Negative for blurred vision and double vision.   Respiratory: Negative.  Negative for shortness of breath.    Cardiovascular:  Negative for chest pain, palpitations and leg swelling.   Gastrointestinal: Negative.    Neurological:  Negative for dizziness, syncope, light-headedness and headache.       No Known Allergies    Current Outpatient Medications on File Prior to Visit   Medication Sig Dispense Refill    Advair Diskus 250-50 MCG/ACT DISKUS       Dupixent 300 MG/2ML solution prefilled syringe       fluticasone (FLONASE) 50 MCG/ACT nasal spray       fluticasone-salmeterol (ADVAIR HFA) 45-21 MCG/ACT inhaler Inhale 2 puffs 2 (Two) Times a Day.       No current facility-administered medications on file prior to visit.       Family History   Problem Relation Age of Onset    Malig Hyperthermia Neg Hx        Past Medical History:   Diagnosis Date    Asthma        Past Surgical History:   Procedure Laterality Date    COLONOSCOPY N/A 11/2/2023    Procedure: COLONOSCOPY;  Surgeon: Abdoul Yeboah MD;  Location:   "[facility];  Service: Gastroenterology;  Laterality: N/A;  External hemorrhoids    HAMMER TOE REPAIR      SINUS SURGERY         Social History     Socioeconomic History    Marital status:    Tobacco Use    Smoking status: Never     Passive exposure: Never    Smokeless tobacco: Never   Vaping Use    Vaping Use: Never used   Substance and Sexual Activity    Alcohol use: No    Drug use: No    Sexual activity: Defer       Patient Active Problem List   Diagnosis    Moderate asthma with acute exacerbation    Encounter for screening for malignant neoplasm of colon       /64   Pulse 95   Temp 98.4 °F (36.9 °C)   Resp 17   Ht 188 cm (74.02\")   Wt 104 kg (230 lb)   SpO2 97%   BMI 29.52 kg/m²   Body mass index is 29.52 kg/m².    Objective   Physical Exam  Constitutional:       Appearance: He is well-developed.   Eyes:      Pupils: Pupils are equal, round, and reactive to light.   Neck:      Thyroid: No thyromegaly.      Vascular: No JVD.      Trachea: No tracheal deviation.   Cardiovascular:      Rate and Rhythm: Normal rate and regular rhythm.      Heart sounds: Normal heart sounds. No murmur heard.     No friction rub. No gallop.   Pulmonary:      Effort: Pulmonary effort is normal. No respiratory distress.      Breath sounds: Normal breath sounds. No wheezing.   Abdominal:      General: Bowel sounds are normal. There is no distension.      Palpations: Abdomen is soft. There is no mass.      Tenderness: There is no abdominal tenderness. There is no guarding or rebound.      Hernia: No hernia is present.   Musculoskeletal:      Cervical back: Normal range of motion and neck supple.   Lymphadenopathy:      Cervical: No cervical adenopathy.   Neurological:      Mental Status: He is alert.           Assessment & Plan   Diagnoses and all orders for this visit:    1. Hematuria, unspecified type (Primary)  -     POC Urinalysis Dipstick, Automated    2. Moderate asthma with acute exacerbation, unspecified whether " persistent    UA is normal today.  Discussed with patient we will see back in 6 months and recheck 2.  CT abdomen pelvis without contrast did not show any kidney stones, or kidney masses.  Patient has no symptoms.  If he has a recurrent hematuria then I will refer him to urologist.  Continue inhaler, follow-up with specialist.  EHR dragon/transcription disclaimer:  Part of this note are created by electronic transcription/translation of spoken language to printed text and thus may lead to erroneous, or at times, nonsensical words or phrases inadvertently transcribed.  Although I have reviewed for such errors, some may still exist.

## 2024-01-05 ENCOUNTER — HOSPITAL ENCOUNTER (OUTPATIENT)
Facility: HOSPITAL | Age: 49
Setting detail: OBSERVATION
Discharge: HOME | End: 2024-01-06
Attending: EMERGENCY MEDICINE | Admitting: EMERGENCY MEDICINE
Payer: MEDICAID

## 2024-01-05 DIAGNOSIS — U07.1 COVID-19: ICD-10-CM

## 2024-01-05 DIAGNOSIS — R93.0 ABNORMAL CT OF THE HEAD: ICD-10-CM

## 2024-01-05 DIAGNOSIS — J36 PERITONSILLAR ABSCESS: Primary | ICD-10-CM

## 2024-01-05 LAB
BASOPHILS # BLD AUTO: 0.11 10*3/MM3 (ref 0–0.2)
BASOPHILS NFR BLD AUTO: 0.9 % (ref 0–1.5)
D-LACTATE SERPL-SCNC: 0.8 MMOL/L (ref 0.5–2)
DEPRECATED RDW RBC AUTO: 40.2 FL (ref 37–54)
EOSINOPHIL # BLD AUTO: 0.81 10*3/MM3 (ref 0–0.4)
EOSINOPHIL NFR BLD AUTO: 7 % (ref 0.3–6.2)
ERYTHROCYTE [DISTWIDTH] IN BLOOD BY AUTOMATED COUNT: 12.9 % (ref 12.3–15.4)
HCT VFR BLD AUTO: 44.3 % (ref 37.5–51)
HGB BLD-MCNC: 15 G/DL (ref 13–17.7)
IMM GRANULOCYTES # BLD AUTO: 0.03 10*3/MM3 (ref 0–0.05)
IMM GRANULOCYTES NFR BLD AUTO: 0.3 % (ref 0–0.5)
LYMPHOCYTES # BLD AUTO: 2.4 10*3/MM3 (ref 0.7–3.1)
LYMPHOCYTES NFR BLD AUTO: 20.6 % (ref 19.6–45.3)
MCH RBC QN AUTO: 29.3 PG (ref 26.6–33)
MCHC RBC AUTO-ENTMCNC: 33.9 G/DL (ref 31.5–35.7)
MCV RBC AUTO: 86.5 FL (ref 79–97)
MONOCYTES # BLD AUTO: 1.41 10*3/MM3 (ref 0.1–0.9)
MONOCYTES NFR BLD AUTO: 12.1 % (ref 5–12)
NEUTROPHILS NFR BLD AUTO: 59.1 % (ref 42.7–76)
NEUTROPHILS NFR BLD AUTO: 6.89 10*3/MM3 (ref 1.7–7)
NRBC BLD AUTO-RTO: 0 /100 WBC (ref 0–0.2)
PLATELET # BLD AUTO: 358 10*3/MM3 (ref 140–450)
PMV BLD AUTO: 10.1 FL (ref 6–12)
RBC # BLD AUTO: 5.12 10*6/MM3 (ref 4.14–5.8)
S PYO AG THROAT QL: NEGATIVE
WBC NRBC COR # BLD AUTO: 11.65 10*3/MM3 (ref 3.4–10.8)

## 2024-01-05 PROCEDURE — 87635 SARS-COV-2 COVID-19 AMP PRB: CPT | Performed by: PHYSICIAN ASSISTANT

## 2024-01-05 PROCEDURE — 87147 CULTURE TYPE IMMUNOLOGIC: CPT | Performed by: PHYSICIAN ASSISTANT

## 2024-01-05 PROCEDURE — 87040 BLOOD CULTURE FOR BACTERIA: CPT | Performed by: PHYSICIAN ASSISTANT

## 2024-01-05 PROCEDURE — 87081 CULTURE SCREEN ONLY: CPT | Performed by: PHYSICIAN ASSISTANT

## 2024-01-05 PROCEDURE — 83605 ASSAY OF LACTIC ACID: CPT | Performed by: PHYSICIAN ASSISTANT

## 2024-01-05 PROCEDURE — 85025 COMPLETE CBC W/AUTO DIFF WBC: CPT | Performed by: PHYSICIAN ASSISTANT

## 2024-01-05 PROCEDURE — 99285 EMERGENCY DEPT VISIT HI MDM: CPT

## 2024-01-05 PROCEDURE — 87880 STREP A ASSAY W/OPTIC: CPT | Performed by: PHYSICIAN ASSISTANT

## 2024-01-05 PROCEDURE — 80053 COMPREHEN METABOLIC PANEL: CPT | Performed by: PHYSICIAN ASSISTANT

## 2024-01-05 RX ORDER — DEXAMETHASONE SODIUM PHOSPHATE 10 MG/ML
10 INJECTION INTRAMUSCULAR; INTRAVENOUS ONCE
Status: COMPLETED | OUTPATIENT
Start: 2024-01-05 | End: 2024-01-06

## 2024-01-05 RX ORDER — SODIUM CHLORIDE 0.9 % (FLUSH) 0.9 %
10 SYRINGE (ML) INJECTION AS NEEDED
Status: DISCONTINUED | OUTPATIENT
Start: 2024-01-05 | End: 2024-01-06 | Stop reason: HOSPADM

## 2024-01-05 RX ORDER — KETOROLAC TROMETHAMINE 30 MG/ML
30 INJECTION, SOLUTION INTRAMUSCULAR; INTRAVENOUS ONCE
Status: COMPLETED | OUTPATIENT
Start: 2024-01-05 | End: 2024-01-06

## 2024-01-06 ENCOUNTER — READMISSION MANAGEMENT (OUTPATIENT)
Dept: CALL CENTER | Facility: HOSPITAL | Age: 49
End: 2024-01-06
Payer: MEDICAID

## 2024-01-06 ENCOUNTER — APPOINTMENT (OUTPATIENT)
Dept: CT IMAGING | Facility: HOSPITAL | Age: 49
End: 2024-01-06
Payer: MEDICAID

## 2024-01-06 VITALS
OXYGEN SATURATION: 96 % | WEIGHT: 229.1 LBS | TEMPERATURE: 98.1 F | BODY MASS INDEX: 28.49 KG/M2 | HEART RATE: 93 BPM | HEIGHT: 75 IN | RESPIRATION RATE: 15 BRPM | DIASTOLIC BLOOD PRESSURE: 94 MMHG | SYSTOLIC BLOOD PRESSURE: 136 MMHG

## 2024-01-06 PROBLEM — J36 PERITONSILLAR ABSCESS: Status: ACTIVE | Noted: 2024-01-06

## 2024-01-06 LAB
ALBUMIN SERPL-MCNC: 4.2 G/DL (ref 3.5–5.2)
ALBUMIN/GLOB SERPL: 1 G/DL
ALP SERPL-CCNC: 76 U/L (ref 39–117)
ALT SERPL W P-5'-P-CCNC: 31 U/L (ref 1–41)
ANION GAP SERPL CALCULATED.3IONS-SCNC: 12.6 MMOL/L (ref 5–15)
AST SERPL-CCNC: 33 U/L (ref 1–40)
BILIRUB SERPL-MCNC: 0.4 MG/DL (ref 0–1.2)
BUN SERPL-MCNC: 10 MG/DL (ref 6–20)
BUN/CREAT SERPL: 10.8 (ref 7–25)
CALCIUM SPEC-SCNC: 9.3 MG/DL (ref 8.6–10.5)
CHLORIDE SERPL-SCNC: 98 MMOL/L (ref 98–107)
CO2 SERPL-SCNC: 22.4 MMOL/L (ref 22–29)
CREAT SERPL-MCNC: 0.93 MG/DL (ref 0.76–1.27)
DEPRECATED RDW RBC AUTO: 38.8 FL (ref 37–54)
EGFRCR SERPLBLD CKD-EPI 2021: 100 ML/MIN/1.73
ERYTHROCYTE [DISTWIDTH] IN BLOOD BY AUTOMATED COUNT: 12.5 % (ref 12.3–15.4)
GLOBULIN UR ELPH-MCNC: 4.4 GM/DL
GLUCOSE SERPL-MCNC: 120 MG/DL (ref 65–99)
HCT VFR BLD AUTO: 44.4 % (ref 37.5–51)
HGB BLD-MCNC: 15.1 G/DL (ref 13–17.7)
HOLD SPECIMEN: NORMAL
HOLD SPECIMEN: NORMAL
MCH RBC QN AUTO: 29.3 PG (ref 26.6–33)
MCHC RBC AUTO-ENTMCNC: 34 G/DL (ref 31.5–35.7)
MCV RBC AUTO: 86 FL (ref 79–97)
PLATELET # BLD AUTO: 373 10*3/MM3 (ref 140–450)
PMV BLD AUTO: 9.6 FL (ref 6–12)
POTASSIUM SERPL-SCNC: 4.2 MMOL/L (ref 3.5–5.2)
PROT SERPL-MCNC: 8.6 G/DL (ref 6–8.5)
RBC # BLD AUTO: 5.16 10*6/MM3 (ref 4.14–5.8)
SARS-COV-2 RNA RESP QL NAA+PROBE: DETECTED
SODIUM SERPL-SCNC: 133 MMOL/L (ref 136–145)
WBC NRBC COR # BLD AUTO: 9.34 10*3/MM3 (ref 3.4–10.8)
WHOLE BLOOD HOLD COAG: NORMAL
WHOLE BLOOD HOLD SPECIMEN: NORMAL

## 2024-01-06 PROCEDURE — 96375 TX/PRO/DX INJ NEW DRUG ADDON: CPT

## 2024-01-06 PROCEDURE — 25010000002 DEXAMETHASONE PER 1 MG: Performed by: PHYSICIAN ASSISTANT

## 2024-01-06 PROCEDURE — 87070 CULTURE OTHR SPECIMN AEROBIC: CPT | Performed by: PHYSICIAN ASSISTANT

## 2024-01-06 PROCEDURE — 25010000002 DEXAMETHASONE PER 1 MG: Performed by: NURSE PRACTITIONER

## 2024-01-06 PROCEDURE — 96366 THER/PROPH/DIAG IV INF ADDON: CPT

## 2024-01-06 PROCEDURE — 70498 CT ANGIOGRAPHY NECK: CPT

## 2024-01-06 PROCEDURE — G0378 HOSPITAL OBSERVATION PER HR: HCPCS

## 2024-01-06 PROCEDURE — 25510000001 IOPAMIDOL 61 % SOLUTION: Performed by: EMERGENCY MEDICINE

## 2024-01-06 PROCEDURE — 85027 COMPLETE CBC AUTOMATED: CPT | Performed by: NURSE PRACTITIONER

## 2024-01-06 PROCEDURE — 87205 SMEAR GRAM STAIN: CPT | Performed by: PHYSICIAN ASSISTANT

## 2024-01-06 PROCEDURE — 36415 COLL VENOUS BLD VENIPUNCTURE: CPT

## 2024-01-06 PROCEDURE — 25010000002 AMPICILLIN-SULBACTAM PER 1.5 G: Performed by: PHYSICIAN ASSISTANT

## 2024-01-06 PROCEDURE — 87147 CULTURE TYPE IMMUNOLOGIC: CPT | Performed by: PHYSICIAN ASSISTANT

## 2024-01-06 PROCEDURE — 70491 CT SOFT TISSUE NECK W/DYE: CPT

## 2024-01-06 PROCEDURE — 25810000003 LACTATED RINGERS SOLUTION: Performed by: PHYSICIAN ASSISTANT

## 2024-01-06 PROCEDURE — 25010000002 AMPICILLIN-SULBACTAM PER 1.5 G: Performed by: NURSE PRACTITIONER

## 2024-01-06 PROCEDURE — 96376 TX/PRO/DX INJ SAME DRUG ADON: CPT

## 2024-01-06 PROCEDURE — 25810000003 LACTATED RINGERS PER 1000 ML: Performed by: PHYSICIAN ASSISTANT

## 2024-01-06 PROCEDURE — 87040 BLOOD CULTURE FOR BACTERIA: CPT | Performed by: PHYSICIAN ASSISTANT

## 2024-01-06 PROCEDURE — 96361 HYDRATE IV INFUSION ADD-ON: CPT

## 2024-01-06 PROCEDURE — 25010000002 KETOROLAC TROMETHAMINE PER 15 MG: Performed by: PHYSICIAN ASSISTANT

## 2024-01-06 PROCEDURE — 25510000001 IOPAMIDOL PER 1 ML: Performed by: EMERGENCY MEDICINE

## 2024-01-06 PROCEDURE — 70496 CT ANGIOGRAPHY HEAD: CPT

## 2024-01-06 PROCEDURE — 96365 THER/PROPH/DIAG IV INF INIT: CPT

## 2024-01-06 RX ORDER — DEXAMETHASONE SODIUM PHOSPHATE 10 MG/ML
8 INJECTION INTRAMUSCULAR; INTRAVENOUS EVERY 8 HOURS
Status: DISCONTINUED | OUTPATIENT
Start: 2024-01-06 | End: 2024-01-06 | Stop reason: HOSPADM

## 2024-01-06 RX ORDER — KETOROLAC TROMETHAMINE 15 MG/ML
15 INJECTION, SOLUTION INTRAMUSCULAR; INTRAVENOUS EVERY 6 HOURS PRN
Status: DISCONTINUED | OUTPATIENT
Start: 2024-01-06 | End: 2024-01-06 | Stop reason: HOSPADM

## 2024-01-06 RX ORDER — SODIUM CHLORIDE 0.9 % (FLUSH) 0.9 %
10 SYRINGE (ML) INJECTION AS NEEDED
Status: DISCONTINUED | OUTPATIENT
Start: 2024-01-06 | End: 2024-01-06 | Stop reason: HOSPADM

## 2024-01-06 RX ORDER — ONDANSETRON 4 MG/1
4 TABLET, ORALLY DISINTEGRATING ORAL EVERY 6 HOURS PRN
Status: DISCONTINUED | OUTPATIENT
Start: 2024-01-06 | End: 2024-01-06 | Stop reason: HOSPADM

## 2024-01-06 RX ORDER — SODIUM CHLORIDE, SODIUM LACTATE, POTASSIUM CHLORIDE, CALCIUM CHLORIDE 600; 310; 30; 20 MG/100ML; MG/100ML; MG/100ML; MG/100ML
100 INJECTION, SOLUTION INTRAVENOUS CONTINUOUS
Status: DISCONTINUED | OUTPATIENT
Start: 2024-01-06 | End: 2024-01-06 | Stop reason: HOSPADM

## 2024-01-06 RX ORDER — FLUTICASONE PROPIONATE AND SALMETEROL 50; 250 UG/1; UG/1
POWDER RESPIRATORY (INHALATION)
Start: 2024-01-06

## 2024-01-06 RX ORDER — DEXAMETHASONE SODIUM PHOSPHATE 10 MG/ML
10 INJECTION INTRAMUSCULAR; INTRAVENOUS EVERY 8 HOURS
Status: DISCONTINUED | OUTPATIENT
Start: 2024-01-06 | End: 2024-01-06

## 2024-01-06 RX ORDER — AMOXICILLIN 250 MG
2 CAPSULE ORAL 2 TIMES DAILY
Status: DISCONTINUED | OUTPATIENT
Start: 2024-01-06 | End: 2024-01-06 | Stop reason: HOSPADM

## 2024-01-06 RX ORDER — SODIUM CHLORIDE 0.9 % (FLUSH) 0.9 %
10 SYRINGE (ML) INJECTION EVERY 12 HOURS SCHEDULED
Status: DISCONTINUED | OUTPATIENT
Start: 2024-01-06 | End: 2024-01-06 | Stop reason: HOSPADM

## 2024-01-06 RX ORDER — ONDANSETRON 2 MG/ML
4 INJECTION INTRAMUSCULAR; INTRAVENOUS EVERY 6 HOURS PRN
Status: DISCONTINUED | OUTPATIENT
Start: 2024-01-06 | End: 2024-01-06 | Stop reason: HOSPADM

## 2024-01-06 RX ORDER — LIDOCAINE HYDROCHLORIDE AND EPINEPHRINE 10; 10 MG/ML; UG/ML
10 INJECTION, SOLUTION INFILTRATION; PERINEURAL ONCE
Status: DISCONTINUED | OUTPATIENT
Start: 2024-01-06 | End: 2024-01-06 | Stop reason: HOSPADM

## 2024-01-06 RX ORDER — BISACODYL 5 MG/1
5 TABLET, DELAYED RELEASE ORAL DAILY PRN
Status: DISCONTINUED | OUTPATIENT
Start: 2024-01-06 | End: 2024-01-06 | Stop reason: HOSPADM

## 2024-01-06 RX ORDER — BUDESONIDE AND FORMOTEROL FUMARATE DIHYDRATE 160; 4.5 UG/1; UG/1
2 AEROSOL RESPIRATORY (INHALATION)
Status: DISCONTINUED | OUTPATIENT
Start: 2024-01-06 | End: 2024-01-06 | Stop reason: HOSPADM

## 2024-01-06 RX ORDER — SODIUM CHLORIDE, SODIUM LACTATE, POTASSIUM CHLORIDE, CALCIUM CHLORIDE 600; 310; 30; 20 MG/100ML; MG/100ML; MG/100ML; MG/100ML
100 INJECTION, SOLUTION INTRAVENOUS CONTINUOUS
Status: DISCONTINUED | OUTPATIENT
Start: 2024-01-06 | End: 2024-01-06

## 2024-01-06 RX ORDER — BISACODYL 10 MG
10 SUPPOSITORY, RECTAL RECTAL DAILY PRN
Status: DISCONTINUED | OUTPATIENT
Start: 2024-01-06 | End: 2024-01-06 | Stop reason: HOSPADM

## 2024-01-06 RX ORDER — POLYETHYLENE GLYCOL 3350 17 G/17G
17 POWDER, FOR SOLUTION ORAL DAILY PRN
Status: DISCONTINUED | OUTPATIENT
Start: 2024-01-06 | End: 2024-01-06 | Stop reason: HOSPADM

## 2024-01-06 RX ORDER — SODIUM CHLORIDE 9 MG/ML
40 INJECTION, SOLUTION INTRAVENOUS AS NEEDED
Status: DISCONTINUED | OUTPATIENT
Start: 2024-01-06 | End: 2024-01-06 | Stop reason: HOSPADM

## 2024-01-06 RX ORDER — AMOXICILLIN AND CLAVULANATE POTASSIUM 875; 125 MG/1; MG/1
1 TABLET, FILM COATED ORAL 2 TIMES DAILY
Qty: 28 TABLET | Refills: 0 | Status: SHIPPED | OUTPATIENT
Start: 2024-01-06 | End: 2024-01-20

## 2024-01-06 RX ORDER — LIDOCAINE HYDROCHLORIDE 20 MG/ML
10 SOLUTION OROPHARYNGEAL
Status: DISCONTINUED | OUTPATIENT
Start: 2024-01-06 | End: 2024-01-06 | Stop reason: HOSPADM

## 2024-01-06 RX ADMIN — DEXAMETHASONE SODIUM PHOSPHATE 10 MG: 10 INJECTION INTRAMUSCULAR; INTRAVENOUS at 00:33

## 2024-01-06 RX ADMIN — Medication 10 ML: at 09:04

## 2024-01-06 RX ADMIN — AMPICILLIN SODIUM AND SULBACTAM SODIUM 3 G: 2; 1 INJECTION, POWDER, FOR SOLUTION INTRAMUSCULAR; INTRAVENOUS at 15:43

## 2024-01-06 RX ADMIN — IOPAMIDOL 95 ML: 755 INJECTION, SOLUTION INTRAVENOUS at 10:03

## 2024-01-06 RX ADMIN — AMPICILLIN SODIUM AND SULBACTAM SODIUM 3 G: 2; 1 INJECTION, POWDER, FOR SOLUTION INTRAMUSCULAR; INTRAVENOUS at 00:39

## 2024-01-06 RX ADMIN — DEXAMETHASONE SODIUM PHOSPHATE 10 MG: 10 INJECTION INTRAMUSCULAR; INTRAVENOUS at 06:24

## 2024-01-06 RX ADMIN — IOPAMIDOL 85 ML: 612 INJECTION, SOLUTION INTRAVENOUS at 01:30

## 2024-01-06 RX ADMIN — AMPICILLIN SODIUM AND SULBACTAM SODIUM 3 G: 2; 1 INJECTION, POWDER, FOR SOLUTION INTRAMUSCULAR; INTRAVENOUS at 08:53

## 2024-01-06 RX ADMIN — SODIUM CHLORIDE, POTASSIUM CHLORIDE, SODIUM LACTATE AND CALCIUM CHLORIDE 1000 ML: 600; 310; 30; 20 INJECTION, SOLUTION INTRAVENOUS at 01:20

## 2024-01-06 RX ADMIN — SODIUM CHLORIDE, POTASSIUM CHLORIDE, SODIUM LACTATE AND CALCIUM CHLORIDE 100 ML/HR: 600; 310; 30; 20 INJECTION, SOLUTION INTRAVENOUS at 15:43

## 2024-01-06 RX ADMIN — SODIUM CHLORIDE, POTASSIUM CHLORIDE, SODIUM LACTATE AND CALCIUM CHLORIDE 100 ML/HR: 600; 310; 30; 20 INJECTION, SOLUTION INTRAVENOUS at 10:10

## 2024-01-06 RX ADMIN — DEXAMETHASONE SODIUM PHOSPHATE 8 MG: 10 INJECTION INTRAMUSCULAR; INTRAVENOUS at 15:43

## 2024-01-06 RX ADMIN — KETOROLAC TROMETHAMINE 30 MG: 30 INJECTION INTRAMUSCULAR; INTRAVENOUS at 00:28

## 2024-01-06 NOTE — PLAN OF CARE
Goal Outcome Evaluation:  Plan of Care Reviewed With: patient        Progress: no change            Problem: Adult Inpatient Plan of Care  Goal: Plan of Care Review  Outcome: Ongoing, Progressing  Flowsheets (Taken 1/6/2024 0550)  Progress: no change  Plan of Care Reviewed With: patient  Goal: Patient-Specific Goal (Individualized)  Outcome: Ongoing, Progressing  Goal: Absence of Hospital-Acquired Illness or Injury  Outcome: Ongoing, Progressing  Intervention: Identify and Manage Fall Risk  Recent Flowsheet Documentation  Taken 1/6/2024 0548 by Cornell Browning RN  Safety Promotion/Fall Prevention:   activity supervised   assistive device/personal items within reach   clutter free environment maintained   fall prevention program maintained   nonskid shoes/slippers when out of bed   room organization consistent   safety round/check completed  Taken 1/6/2024 0525 by Cornell Browning RN  Safety Promotion/Fall Prevention:   activity supervised   assistive device/personal items within reach   clutter free environment maintained   fall prevention program maintained   nonskid shoes/slippers when out of bed   room organization consistent   safety round/check completed  Intervention: Prevent Skin Injury  Recent Flowsheet Documentation  Taken 1/6/2024 0548 by Cornell Browning RN  Body Position: position changed independently  Taken 1/6/2024 0525 by Cornell Browning RN  Body Position: position changed independently  Intervention: Prevent and Manage VTE (Venous Thromboembolism) Risk  Recent Flowsheet Documentation  Taken 1/6/2024 0525 by Cornell Browning RN  Activity Management:   ambulated in room   sitting, edge of bed   standing at bedside  Range of Motion: active ROM (range of motion) encouraged  Intervention: Prevent Infection  Recent Flowsheet Documentation  Taken 1/6/2024 0548 by Cornell Browning RN  Infection Prevention:   rest/sleep promoted   single patient room provided  Taken 1/6/2024 0525 by Cornell Browning RN  Infection  Prevention:   rest/sleep promoted   single patient room provided  Goal: Optimal Comfort and Wellbeing  Outcome: Ongoing, Progressing  Intervention: Provide Person-Centered Care  Recent Flowsheet Documentation  Taken 1/6/2024 0525 by Cornell Browning RN  Trust Relationship/Rapport:   care explained   choices provided   questions answered   questions encouraged  Goal: Readiness for Transition of Care  Outcome: Ongoing, Progressing  Intervention: Mutually Develop Transition Plan  Recent Flowsheet Documentation  Taken 1/6/2024 0532 by Cornell Browning RN  Transportation Anticipated: family or friend will provide  Patient/Family Anticipated Services at Transition: none  Patient/Family Anticipates Transition to: home  Taken 1/6/2024 0531 by Cornell Browning RN  Equipment Currently Used at Home: none  Patient/Family Anticipated Services at Transition: none  Patient/Family Anticipates Transition to:   home   home with family     Problem: Adult Inpatient Plan of Care  Goal: Absence of Hospital-Acquired Illness or Injury  Intervention: Prevent Skin Injury  Recent Flowsheet Documentation  Taken 1/6/2024 0548 by Cornell Browning RN  Body Position: position changed independently  Taken 1/6/2024 0525 by Cornell Browning RN  Body Position: position changed independently     Problem: Adult Inpatient Plan of Care  Goal: Absence of Hospital-Acquired Illness or Injury  Intervention: Prevent and Manage VTE (Venous Thromboembolism) Risk  Recent Flowsheet Documentation  Taken 1/6/2024 0525 by Cornell Browning RN  Activity Management:   ambulated in room   sitting, edge of bed   standing at bedside  Range of Motion: active ROM (range of motion) encouraged     Problem: Adult Inpatient Plan of Care  Goal: Absence of Hospital-Acquired Illness or Injury  Intervention: Prevent Infection  Recent Flowsheet Documentation  Taken 1/6/2024 0548 by Cornell Browning RN  Infection Prevention:   rest/sleep promoted   single patient room provided  Taken  1/6/2024 0525 by Cornell Browning RN  Infection Prevention:   rest/sleep promoted   single patient room provided     Problem: Adult Inpatient Plan of Care  Goal: Optimal Comfort and Wellbeing  Outcome: Ongoing, Progressing  Intervention: Provide Person-Centered Care  Recent Flowsheet Documentation  Taken 1/6/2024 0525 by Cornell Browning RN  Trust Relationship/Rapport:   care explained   choices provided   questions answered   questions encouraged     Problem: Adult Inpatient Plan of Care  Goal: Readiness for Transition of Care  Outcome: Ongoing, Progressing  Intervention: Mutually Develop Transition Plan  Recent Flowsheet Documentation  Taken 1/6/2024 0532 by Cornell Browning RN  Transportation Anticipated: family or friend will provide  Patient/Family Anticipated Services at Transition: none  Patient/Family Anticipates Transition to: home  Taken 1/6/2024 0531 by Cornell Browning RN  Equipment Currently Used at Home: none  Patient/Family Anticipated Services at Transition: none  Patient/Family Anticipates Transition to:   home   home with family     Problem: Pain Acute  Goal: Acceptable Pain Control and Functional Ability  Outcome: Ongoing, Progressing  Intervention: Optimize Psychosocial Wellbeing  Recent Flowsheet Documentation  Taken 1/6/2024 0525 by Cornell Browning RN  Diversional Activities: television     Plan of care reviewed with patient, who verbalized understanding.  Pt denies pain or discomfort at this time.  Pt is able to ambulate independently.  NPO status has been reviewed.

## 2024-01-06 NOTE — PLAN OF CARE
Goal Outcome Evaluation:   Pt is being discharged and leaving via self. Pt to follow up with PCP in 1 to 2 weeks. Pt to follow up with ENT, their office to call to make that appointment. Pt being started on two new meds and they were sent to preferred pharmacy. Pt has no questions or concerns at this time. Pt leaving unit ambulatory.

## 2024-01-06 NOTE — ED NOTES
Nursing report ED to floor  Hope Messina  49 y.o.  male    HPI :   Chief Complaint   Patient presents with    Sore Throat       Admitting doctor:   Jason Haas MD    Admitting diagnosis:   The primary encounter diagnosis was Peritonsillar abscess. Diagnoses of Abnormal CT of the head and COVID-19 were also pertinent to this visit.    Code status:   Current Code Status       Date Active Code Status Order ID Comments User Context       1/6/2024 0308 CPR (Attempt to Resuscitate) [id number]  Samara Sofia, [facility]        Question Answer    Code Status (Patient has no pulse and is not breathing) CPR (Attempt to Resuscitate)    Medical Interventions (Patient has pulse or is breathing) Full Support    Level Of Support Discussed With Patient                    Allergies:   Patient has no known allergies.    Isolation:   Enhanced Droplet/Contact     Intake and Output    Intake/Output Summary (Last 24 hours) at 1/6/2024 0358  Last data filed at 1/6/2024 0356  Gross per 24 hour   Intake 1100 ml   Output --   Net 1100 ml       Weight:       01/06/24  0356   Weight: 99.8 kg (220 lb)       Most recent vitals:   Vitals:    01/06/24 0037 01/06/24 0106 01/06/24 0124 01/06/24 0356   BP:    118/84   BP Location:       Patient Position:       Pulse: 99 105 106 96   Resp:       Temp:       TempSrc:       SpO2: 97% 96% 94% 94%   Weight:    99.8 kg (220 lb)   Height:           Active LDAs/IV Access:   Lines, Drains & Airways       Active LDAs       Name Placement date Placement time Site Days    Peripheral IV 01/05/24 2330 Anterior;Distal;Right;Upper Arm 01/05/24  2330  Arm  less than 1                    Labs (abnormal labs have a star):   Labs Reviewed   COVID-19, [facility]SE CEPHEID/FRANCISCO, NP SWAB IN TRANSPORT MEDIA 1 HR TAT - Abnormal; Notable for the following components:       Result Value    COVID19 Detected (*)     All other components within normal limits    Narrative:     Fact sheet for providers:  https://www.fda.gov/media/664873/download    Fact sheet for patients: https://www.fda.gov/media/500212/download    Test performed by PCR.   COMPREHENSIVE METABOLIC PANEL - Abnormal; Notable for the following components:    Glucose 120 (*)     Sodium 133 (*)     Total Protein 8.6 (*)     All other components within normal limits    Narrative:     GFR Normal >60  Chronic Kidney Disease <60  Kidney Failure <15     CBC WITH AUTO DIFFERENTIAL - Abnormal; Notable for the following components:    WBC 11.65 (*)     Monocyte % 12.1 (*)     Eosinophil % 7.0 (*)     Monocytes, Absolute 1.41 (*)     Eosinophils, Absolute 0.81 (*)     All other components within normal limits   RAPID STREP A SCREEN - Normal   LACTIC ACID, PLASMA - Normal   COVID PRE-OP / PRE-PROCEDURE SCREENING ORDER (NO ISOLATION)    Narrative:     The following orders were created for panel order COVID PRE-OP / PRE-PROCEDURE SCREENING ORDER (NO ISOLATION) - Swab, Nasopharynx.  Procedure                               Abnormality         Status                     ---------                               -----------         ------                     COVID-19,[facility]...[[id number]]  Abnormal            Final result                 Please view results for these tests on the individual orders.   BLOOD CULTURE   BLOOD CULTURE   BETA HEMOLYTIC STREP CULTURE, THROAT   RAINBOW DRAW    Narrative:     The following orders were created for panel order [place] Draw.  Procedure                               Abnormality         Status                     ---------                               -----------         ------                     Green Top (Gel)[[id number]]                                  Final result               Lavender Top[[id number]]                                     Final result               Gold Top - SST[[id number]]                                   Final result               Light Blue Top[[id number]]                                   Final result                  Please view results for these tests on the individual orders.   CBC (NO DIFF)   CBC AND DIFFERENTIAL    Narrative:     The following orders were created for panel order CBC & Differential.  Procedure                               Abnormality         Status                     ---------                               -----------         ------                     CBC Auto Differential[[id number]]        Abnormal            Final result                 Please view results for these tests on the individual orders.   GREEN TOP   LAVENDER TOP   GOLD TOP - SST   LIGHT BLUE TOP       EKG:   No orders to display       Meds given in ED:   Medications   sodium chloride 0.9 % flush 10 mL (has no administration in time range)   sodium chloride 0.9 % flush 10 mL (has no administration in time range)   sodium chloride 0.9 % flush 10 mL (has no administration in time range)   sodium chloride 0.9 % infusion 40 mL (has no administration in time range)   sennosides-docusate (PERICOLACE) 8.6-50 MG per tablet 2 tablet (has no administration in time range)     And   polyethylene glycol (MIRALAX) packet 17 g (has no administration in time range)     And   bisacodyl (DULCOLAX) EC tablet 5 mg (has no administration in time range)     And   bisacodyl (DULCOLAX) suppository 10 mg (has no administration in time range)   ondansetron ODT (ZOFRAN-ODT) disintegrating tablet 4 mg (has no administration in time range)     Or   ondansetron (ZOFRAN) injection 4 mg (has no administration in time range)   dexAMETHasone (DECADRON) injection 10 mg (has no administration in time range)   ampicillin-sulbactam (UNASYN) 3 g in sodium chloride 0.9 % 100 mL IVPB-VTB (has no administration in time range)   ketorolac (TORADOL) injection 15 mg (has no administration in time range)   lactated ringers bolus 1,000 mL (0 mL Intravenous Stopped 1/6/24 0356)   ketorolac (TORADOL) injection 30 mg (30 mg Intravenous Given 1/6/24 0028)   ampicillin-sulbactam  (UNASYN) 3 g in sodium chloride 0.9 % 100 mL IVPB-VTB (0 g Intravenous Stopped 1/6/24 0119)   dexAMETHasone (DECADRON) injection 10 mg (10 mg Intravenous Given 1/6/24 0033)   iopamidol (ISOVUE-300) 61 % injection 85 mL (85 mL Intravenous Given 1/6/24 0130)       Imaging results:  CT Soft Tissue Neck With Contrast    Result Date: 1/6/2024   1. The patient has asymmetric enlargement and heterogeneous enhancement of the left pharyngeal mucosal space. There is a low-attenuation area within this region, which may reflect peritonsillar abscess. Edema also extends into the left parapharyngeal space, and there does appear to be some prevertebral edema. There is mass effect upon the nasopharynx and oropharynx. There is some asymmetric thickening of the left side of the epiglottis, with effacement of the left vallecula and piriform sinus. 2. Area of enhancement noted within the right middle cranial fossa, incompletely assessed on this exam. It has a globular configuration, and aneurysm is not excluded. This would be better assessed with CTA. 3. Area of decreased attenuation within the left middle cranial fossa may reflect an arachnoid cyst. Again, it is incompletely assessed on this exam. Radiation dose reduction techniques were utilized, including automated exposure control and exposure modulation based on body size.   This report was finalized on 1/6/2024 2:10 AM by Dr. Joycelyn Santamaria M.D on Workstation: [id number]       Ambulatory status:   - ad cruzito    Social issues:   Social History     Socioeconomic History    Marital status:    Tobacco Use    Smoking status: Never     Passive exposure: Never    Smokeless tobacco: Never   Vaping Use    Vaping Use: Never used   Substance and Sexual Activity    Alcohol use: No    Drug use: No    Sexual activity: Defer       NIH Stroke Scale:       Lindsay Noel RN  01/06/24 03:58 EST

## 2024-01-06 NOTE — ED PROVIDER NOTES
EMERGENCY DEPARTMENT ENCOUNTER    Room Number:  08/08  Date of encounter:  1/6/2024  PCP: Niya Marcum MD  Historian: Patient    HPI:  Chief Complaint: Sore throat, fever, and chills    Context: Hope Messina is a 49 y.o. male with a PMH significant for asthma who presents to the emergency department complaining of a sore throat, fever, and chills over the last few days.  Patient reports that the left side of his throat hurts worse.  He is a  but denies any known sick contacts.  He has a history of asthma and says he has felt somewhat short of breath but denies a cough.  He does have body aches.  He has been taking Tylenol and using cough drops for the pain with no improvement.  He denies abdominal pain, nausea, vomiting, diarrhea.    MEDICAL RECORD REVIEW:  11/13/2023: PCP office visit  Assessment & Plan  Diagnoses and all orders for this visit:     1. Hematuria, unspecified type (Primary)  -     POC Urinalysis Dipstick, Automated     2. Moderate asthma with acute exacerbation, unspecified whether persistent     UA is normal today.  Discussed with patient we will see back in 6 months and recheck 2.  CT abdomen pelvis without contrast did not show any kidney stones, or kidney masses.  Patient has no symptoms.  If he has a recurrent hematuria then I will refer him to urologist.  Continue inhaler, follow-up with specialist.  EHR dragon/transcription disclaimer:  Part of this note are created by electronic transcription/translation of spoken language to printed text and thus may lead to erroneous, or at times, nonsensical words or phrases inadvertently transcribed.  Although I have reviewed for such errors, some may still exist.          PAST MEDICAL HISTORY    Active Ambulatory Problems     Diagnosis Date Noted    Moderate asthma with acute exacerbation 08/07/2023    Encounter for screening for malignant neoplasm of colon 10/04/2023     Resolved Ambulatory Problems     Diagnosis Date Noted    Moderate  asthma with acute exacerbation 12/18/2014     Past Medical History:   Diagnosis Date    Asthma          PAST SURGICAL HISTORY  Past Surgical History:   Procedure Laterality Date    COLONOSCOPY N/A 11/2/2023    Procedure: COLONOSCOPY;  Surgeon: Abdoul Yeboah MD;  Location: [facility];  Service: Gastroenterology;  Laterality: N/A;  External hemorrhoids    HAMMER TOE REPAIR      SINUS SURGERY           FAMILY HISTORY  Family History   Problem Relation Age of Onset    Malig Hyperthermia Neg Hx          SOCIAL HISTORY  Social History     Socioeconomic History    Marital status:    Tobacco Use    Smoking status: Never     Passive exposure: Never    Smokeless tobacco: Never   Vaping Use    Vaping Use: Never used   Substance and Sexual Activity    Alcohol use: No    Drug use: No    Sexual activity: Defer         ALLERGIES  Patient has no known allergies.        REVIEW OF SYSTEMS  All systems reviewed and marked as negative except as listed in HPI         PHYSICAL EXAM  I have reviewed the triage vital signs and nursing notes.    ED Triage Vitals   Temp Heart Rate Resp BP SpO2   01/05/24 2218 01/05/24 2218 01/05/24 2218 01/05/24 2244 01/05/24 2218   100 °F (37.8 °C) (!) 135 18 138/94 96 %      Temp src Heart Rate Source Patient Position BP Location FiO2 (%)   01/05/24 2218 01/05/24 2218 01/05/24 2244 01/05/24 2244 --   Tympanic Monitor Lying Right arm        Physical Exam  Vitals and nursing note reviewed.   Constitutional:       General: He is not in acute distress.     Appearance: Normal appearance. He is ill-appearing. He is not toxic-appearing.   HENT:      Head: Normocephalic and atraumatic.      Mouth/Throat:      Mouth: Mucous membranes are moist.      Tonsils: Tonsillar abscess (There is significant left-sided peritonsillar edema consistent with peritonsillar abscess.  No active drainage noted.) present.      Comments: There is some trismus and uvula is deviated to the right.  Handling oral secretions well.   Normal tone of voice.  Eyes:      Extraocular Movements: Extraocular movements intact.      Pupils: Pupils are equal, round, and reactive to light.   Cardiovascular:      Rate and Rhythm: Regular rhythm. Tachycardia present.   Pulmonary:      Effort: Pulmonary effort is normal. No respiratory distress.      Breath sounds: Normal breath sounds.   Abdominal:      General: Abdomen is flat.      Palpations: Abdomen is soft.      Tenderness: There is no abdominal tenderness.   Musculoskeletal:         General: Normal range of motion.      Cervical back: Normal range of motion and neck supple.   Skin:     General: Skin is warm and dry.      Capillary Refill: Capillary refill takes less than 2 seconds.   Neurological:      General: No focal deficit present.      Mental Status: He is alert and oriented to person, place, and time.   Psychiatric:         Mood and Affect: Mood normal.         Behavior: Behavior normal.         Vital signs and nursing notes reviewed.          LAB RESULTS  Recent Results (from the past 24 hour(s))   Comprehensive Metabolic Panel    Collection Time: 01/05/24 11:27 PM    Specimen: Blood   Result Value Ref Range    Glucose 120 (H) 65 - 99 mg/dL    BUN 10 6 - 20 mg/dL    Creatinine 0.93 0.76 - 1.27 mg/dL    Sodium 133 (L) 136 - 145 mmol/L    Potassium 4.2 3.5 - 5.2 mmol/L    Chloride 98 98 - 107 mmol/L    CO2 22.4 22.0 - 29.0 mmol/L    Calcium 9.3 8.6 - 10.5 mg/dL    Total Protein 8.6 (H) 6.0 - 8.5 g/dL    Albumin 4.2 3.5 - 5.2 g/dL    ALT (SGPT) 31 1 - 41 U/L    AST (SGOT) 33 1 - 40 U/L    Alkaline Phosphatase 76 39 - 117 U/L    Total Bilirubin 0.4 0.0 - 1.2 mg/dL    Globulin 4.4 gm/dL    A/G Ratio 1.0 g/dL    BUN/Creatinine Ratio 10.8 7.0 - 25.0    Anion Gap 12.6 5.0 - 15.0 mmol/L    eGFR 100.0 >60.0 mL/min/1.73   Lactic Acid, Plasma    Collection Time: 01/05/24 11:27 PM    Specimen: Blood   Result Value Ref Range    Lactate 0.8 0.5 - 2.0 mmol/L   Green Top (Gel)    Collection Time: 01/05/24  11:27 PM   Result Value Ref Range    Extra Tube Hold for add-ons.    Lavender Top    Collection Time: 01/05/24 11:27 PM   Result Value Ref Range    Extra Tube hold for add-on    Gold Top - SST    Collection Time: 01/05/24 11:27 PM   Result Value Ref Range    Extra Tube Hold for add-ons.    CBC Auto Differential    Collection Time: 01/05/24 11:27 PM    Specimen: Blood   Result Value Ref Range    WBC 11.65 (H) 3.40 - 10.80 10*3/mm3    RBC 5.12 4.14 - 5.80 10*6/mm3    Hemoglobin 15.0 13.0 - 17.7 g/dL    Hematocrit 44.3 37.5 - 51.0 %    MCV 86.5 79.0 - 97.0 fL    MCH 29.3 26.6 - 33.0 pg    MCHC 33.9 31.5 - 35.7 g/dL    RDW 12.9 12.3 - 15.4 %    RDW-SD 40.2 37.0 - 54.0 fl    MPV 10.1 6.0 - 12.0 fL    Platelets 358 140 - 450 10*3/mm3    Neutrophil % 59.1 42.7 - 76.0 %    Lymphocyte % 20.6 19.6 - 45.3 %    Monocyte % 12.1 (H) 5.0 - 12.0 %    Eosinophil % 7.0 (H) 0.3 - 6.2 %    Basophil % 0.9 0.0 - 1.5 %    Immature Grans % 0.3 0.0 - 0.5 %    Neutrophils, Absolute 6.89 1.70 - 7.00 10*3/mm3    Lymphocytes, Absolute 2.40 0.70 - 3.10 10*3/mm3    Monocytes, Absolute 1.41 (H) 0.10 - 0.90 10*3/mm3    Eosinophils, Absolute 0.81 (H) 0.00 - 0.40 10*3/mm3    Basophils, Absolute 0.11 0.00 - 0.20 10*3/mm3    Immature Grans, Absolute 0.03 0.00 - 0.05 10*3/mm3    nRBC 0.0 0.0 - 0.2 /100 WBC   COVID-19,[facility] IN-HOUSE CEPHEID/FRANCISCO NP SWAB IN TRANSPORT MEDIA 1 HR TAT - Swab, Nasopharynx    Collection Time: 01/05/24 11:27 PM    Specimen: Nasopharynx; Swab   Result Value Ref Range    COVID19 Detected (C) Not Detected - Ref. Range   Rapid Strep A Screen - Swab, Throat    Collection Time: 01/05/24 11:35 PM    Specimen: Throat; Swab   Result Value Ref Range    Strep A Ag Negative Negative   Light Blue Top    Collection Time: 01/05/24 11:35 PM   Result Value Ref Range    Extra Tube Hold for add-ons.        Ordered the above labs and independently reviewed the results.        RADIOLOGY  CT Soft Tissue Neck With Contrast    Result Date:  1/6/2024  CT OF THE NECK SOFT TISSUE WITH CONTRAST  HISTORY: Sore throat and fever  COMPARISON: None available.  TECHNIQUE: Axial CT imaging was obtained through the neck. IV contrast was administered.  FINDINGS: Within the left middle cranial fossa, there is an area of decreased attenuation. This may reflect an arachnoid cyst. There is also an area of enhancement within the right temporal lobe, of uncertain etiology. It is incompletely assessed on this exam. It does have a rounded configuration, an aneurysm is not excluded. Consider further assessment with CT angiography of the head. Orbits appear unremarkable. There is extensive opacification of the ethmoid sinuses, as well as mucosal thickening within the maxillary sinuses, associated with sclerosis of the walls of the maxillary sinuses. Appearance is most in keeping with chronic sinus disease. There is also evidence of prior sinus surgery. Trace fluid is noted within the mastoid air cells bilaterally. There is asymmetric enlargement of the left pharyngeal mucosal space, which enhances heterogeneously. There does appear to be a collection within this area, measuring up to 1.1 x 0.9 cm on the axial series. It measures up to 1.7 cm in craniocaudal dimensions, and is suspicious for a peritonsillar abscess. There is mass effect upon the nasopharynx and oropharynx, and edema also involves the left parapharyngeal space. The left side of the epiglottis is mildly thickened. There is fluid and debris which is noted within the left vallecula and piriform sinus. Vocal cords appear intact. Trachea and esophagus appear unremarkable. Thyroid gland is normal. No acute infiltrates are noted within the visualized lungs. Images do suggest some dilatation of the aortic root, measuring up to 4.2 cm, incompletely assessed on this exam. Right vertebral artery is extremely small in caliber. It is poorly assessed on a nonangiographic protocol CT. There does appear to be some  prevertebral edema. Prominent cervical lymph nodes are noted, likely reactive.       1. The patient has asymmetric enlargement and heterogeneous enhancement of the left pharyngeal mucosal space. There is a low-attenuation area within this region, which may reflect peritonsillar abscess. Edema also extends into the left parapharyngeal space, and there does appear to be some prevertebral edema. There is mass effect upon the nasopharynx and oropharynx. There is some asymmetric thickening of the left side of the epiglottis, with effacement of the left vallecula and piriform sinus. 2. Area of enhancement noted within the right middle cranial fossa, incompletely assessed on this exam. It has a globular configuration, and aneurysm is not excluded. This would be better assessed with CTA. 3. Area of decreased attenuation within the left middle cranial fossa may reflect an arachnoid cyst. Again, it is incompletely assessed on this exam. Radiation dose reduction techniques were utilized, including automated exposure control and exposure modulation based on body size.   This report was finalized on 1/6/2024 2:10 AM by Dr. Joycelyn Santamaria M.D on Workstation: [id number]       I ordered the above noted radiological studies. Independently reviewed by me and discussed with radiologist.  See dictation above for official radiology interpretation.            MEDICATIONS GIVEN IN ER  Medications   sodium chloride 0.9 % flush 10 mL (has no administration in time range)   sodium chloride 0.9 % flush 10 mL (has no administration in time range)   sodium chloride 0.9 % flush 10 mL (has no administration in time range)   sodium chloride 0.9 % infusion 40 mL (has no administration in time range)   sennosides-docusate (PERICOLACE) 8.6-50 MG per tablet 2 tablet (has no administration in time range)     And   polyethylene glycol (MIRALAX) packet 17 g (has no administration in time range)     And   bisacodyl (DULCOLAX) EC tablet 5 mg (has no  administration in time range)     And   bisacodyl (DULCOLAX) suppository 10 mg (has no administration in time range)   ondansetron ODT (ZOFRAN-ODT) disintegrating tablet 4 mg (has no administration in time range)     Or   ondansetron (ZOFRAN) injection 4 mg (has no administration in time range)   dexAMETHasone (DECADRON) injection 10 mg (has no administration in time range)   ampicillin-sulbactam (UNASYN) 3 g in sodium chloride 0.9 % 100 mL IVPB-VTB (has no administration in time range)   ketorolac (TORADOL) injection 15 mg (has no administration in time range)   lactated ringers bolus 1,000 mL (0 mL Intravenous Stopped 1/6/24 0356)   ketorolac (TORADOL) injection 30 mg (30 mg Intravenous Given 1/6/24 0028)   ampicillin-sulbactam (UNASYN) 3 g in sodium chloride 0.9 % 100 mL IVPB-VTB (0 g Intravenous Stopped 1/6/24 0119)   dexAMETHasone (DECADRON) injection 10 mg (10 mg Intravenous Given 1/6/24 0033)   iopamidol (ISOVUE-300) 61 % injection 85 mL (85 mL Intravenous Given 1/6/24 0130)         ED Course:  ED Course as of 01/06/24 0447   Fri Jan 05, 2024   2306 I discussed the case with Dr. Hughes and they agree to evaluate the patient at the bedside.    [CC]   Sat Jan 06, 2024   0006 COVID19(!!): Detected [CC]   0012 Lactate: 0.8 [CC]   0210 CT neck soft tissue independently interpreted in PACS and appears consistent with left peritonsillar abscess. [JR]   0247 I spoke with Dr. Burton with ENT.  He recommends continuing 8mg Decadron q 8 hours and Unasyn in the morning.  He would like if possible for an 18g spinal needle, 10cc syringe lido w/epi, culture swab and I&D supplies be ready at the bedside as he plans to perform I&D in the morning. [CC]   0257 Spoke with ZAIDA Pascual with the ops unit who agrees to admit the patient to Dr. Haas.  I did discuss abnormal head CT and she will  make sure patient has scan or follow-up prior to discharge. [CC]   3231 I rechecked the patient.  I discussed the patient's labs,  radiology findings (including all incidental findings), diagnosis, and plan for admission. The patient understands and agrees with the plan.   [CC]      ED Course User Index  [CC] Olga Lidia Nagy PA-C  [JR] Pb Hughes MD           DDX include but not limited to:   peritonsillar abscess, pharyngitis, uveitis, COVID, flu, strep    MDM:  Orders placed during this visit:  Orders Placed This Encounter   Procedures    Blood Culture - Blood,    Blood Culture - Blood,    COVID PRE-OP / PRE-PROCEDURE SCREENING ORDER (NO ISOLATION) - Swab, Nasopharynx    Rapid Strep A Screen - Swab, Throat    COVID-19,[facility] IN-HOUSE CEPHEID/FRANCISCO NP SWAB IN TRANSPORT MEDIA 1 HR TAT - Swab, Nasopharynx    Beta Strep Culture, Throat - Swab, Throat    CT Soft Tissue Neck With Contrast    [place] Draw    Comprehensive Metabolic Panel    Lactic Acid, Plasma    CBC Auto Differential    CBC (No Diff)    NPO Diet NPO Type: Strict NPO    Undress & Gown    Continuous Pulse Oximetry    Vital Signs    Advance Diet As Tolerated -    Intake & Output    Weigh Patient    Oral Care    Saline Lock & Maintain IV Access    Vital Signs    Activity - Ad Anamika    Code Status and Medical Interventions:    Inpatient ENT Consult    Inpatient Hospitalist Consult    Inpatient ENT Consult    Oxygen Therapy- Nasal Cannula; Titrate 1-6 LPM Per SpO2; 90 - 95%    Insert Peripheral IV    Insert Peripheral IV    Initiate ED Observation Status    CBC & Differential    Green Top (Gel)    Lavender Top    Gold Top - SST    Light Blue Top       AS OF 04:47 EST VITALS:    BP - 118/84  HR - 96  TEMP - 100 °F (37.8 °C) (Tympanic)  02 SATS - 94%    Patient is a 49-year-old male who presents emergency department today with a sore throat.  On arrival he had a low-grade temperature and was tachycardic.  On my exam the patient appears to feel unwell and has what appears to be a left peritonsillar abscess with some mild trismus.  He had no airway compromise on my initial  evaluation.  Patient was evaluated with labs which were overall reassuring, lactic negative.  He was evaluated with viral swab and when she was positive for COVID.  Strep negative.  Due to the nature and the size of the peritonsillar abscess and trismus patient was evaluated with a CT soft tissue neck with contrast and it was confirmed that he had a large peritonsillar abscess causing mass effect and oropharynx.  He has had no respiratory compromise here in the ED but will require admission to the hospital for ENT consultation and I&D.  He was given Unasyn and Decadron and did meet criteria for sepsis.  Of note, on the patient CT of the neck there was an incidental finding of an abnormality in the head that could represent an aneurysm.  I did discuss this with the observation unit team and they will follow-up on this prior to the patient discharge.  He is stable at the time of admission.      DIAGNOSIS  Final diagnoses:   Abnormal CT of the head   Peritonsillar abscess   COVID-19         Critical care:  Total critical care time of 30 minutes is exclusive of any other billable procedures and includes time spent with direct patient care and observation, retrospective chart review, management of acute condition, and consultation with other medical providers.    Pt masked in first look. I wore a surgical mask throughout my encounters with the pt. I performed hand hygiene on entry into the pt room and upon exit.     Dictated utilizing Dragon dictation     Note Disclaimer: At [facility], we believe that sharing information builds trust and better relationships. You are receiving this note because you recently visited [facility]. It is possible you will see health information before a provider has talked with you about it. This kind of information can be easy to misunderstand. To help you fully understand what it means for your health, we urge you to discuss this note with your provider.      Olga Lidia Nagy,  LEOBARDO  01/06/24 0548       Olga Lidia Nagy PA-C  01/06/24 0616

## 2024-01-06 NOTE — H&P
[facility]   HISTORY AND PHYSICAL    Patient Name: Hope Messina  : 1975  MRN: [id number]  Primary Care Physician:  Niya Marcum MD  Date of admission: 2024    Subjective   Subjective     Chief Complaint:   Chief Complaint   Patient presents with    Sore Throat         HPI:    Hope Messina is a 49 y.o. male with history of asthma who presented to the emergency department last night complaining of sore throat, fever, and chills for the last few days.  Patient complaining of pain on the left side of his throat.  Patient works as a  but denies known sick contacts.  Patient also feels more short of breath than usual, has history of asthma.  Denies cough.  Denies chest pain, palpitations, nausea, vomiting, diarrhea.    ED evaluation reviewed, patient tested positive for COVID.  Strep negative.  CT soft tissue neck with contrast shows asymmetric enlargement and heterogenous enhancement of the left pharyngeal mucosal space, low-attenuation area within this region which may reflect peritonsillar abscess.  There is also an incidental finding of an area of enhancement noted within the left middle cranial fossa, incompletely assessed on this exam, globular configuration, aneurysm not excluded.  Laboratory evaluation reassuring, sodium 133, glucose 120, initial WBC 11.65, repeat 9.34.  Patient did arrive to ED with low-grade fever of 100 °F and was tachycardic at 135 bpm.    Patient is being admitted to observation unit for further evaluation and treatment, ENT consult for peritonsillar abscess.  ED provider discussed with ENT, Dr. Burton.  He recommended IV Decadron 8 mg every 8 hours as well as IV Unasyn and he will consult.    Review of Systems   All systems were reviewed and negative except for: What is discussed in the HPI    Personal History     Past Medical History:   Diagnosis Date    Asthma        Past Surgical History:   Procedure Laterality Date    COLONOSCOPY N/A 2023    Procedure:  COLONOSCOPY;  Surgeon: Abdoul Yeboah MD;  Location: [facility];  Service: Gastroenterology;  Laterality: N/A;  External hemorrhoids    HAMMER TOE REPAIR      SINUS SURGERY         Family History: family history is not on file. Otherwise pertinent FHx was reviewed and not pertinent to current issue.    Social History:  reports that he has never smoked. He has never been exposed to tobacco smoke. He has never used smokeless tobacco. He reports that he does not drink alcohol and does not use drugs.    Home Medications:  Dupilumab, Fluticasone-Salmeterol, fluticasone, and fluticasone-salmeterol    Allergies:  No Known Allergies    Objective   Objective     Vitals:   Temp:  [98.2 °F (36.8 °C)-100 °F (37.8 °C)] 98.2 °F (36.8 °C)  Heart Rate:  [] 94  Resp:  [12-18] 12  BP: (118-138)/(84-94) 129/85  Physical Exam     GENERAL: 49 y.o. YO male a/o x 4, NAD  SKIN: Warm pink and dry   HEENT:  PERRL, EOM intact, conjunctivae normal, sclerae clear, left-sided peritonsillar edema consistent with peritonsillar abscess, uvula deviated to the right  NECK: supple, no JVD  LUNGS: Clear to auscultation bilaterally without wheezes, rales or rhonchi.  No accessory muscle use and no nasal flaring.  CARDIAC:  Regular rate and rhythm, S1-S2.  No murmurs, rubs or gallops.  No peripheral edema.  Equal pulses bilaterally.  ABDOMEN: Soft, nontender, nondistended.  No guarding or rebound tenderness.  Normal bowel sounds.  MUSCULOSKELETAL: Moves all extremities well.  No deformity.  NEURO: Cranial nerves II through XII grossly intact.  No gross focal deficits.  Alert.  Normal speech and motor.  PSYCH: Normal mood and affect      Result Review    Result Review:  I have personally reviewed the results from the time of this admission to 1/6/2024 07:33 EST and agree with these findings:  [x]  Laboratory list / accordion  []  Microbiology  [x]  Radiology  []  EKG/Telemetry   []  Cardiology/Vascular   []  Pathology  []  Old records  []   Other:  Most notable findings include: RVP COVID+    CT Soft Tissue Neck With Contrast    Result Date: 1/6/2024   1. The patient has asymmetric enlargement and heterogeneous enhancement of the left pharyngeal mucosal space. There is a low-attenuation area within this region, which may reflect peritonsillar abscess. Edema also extends into the left parapharyngeal space, and there does appear to be some prevertebral edema. There is mass effect upon the nasopharynx and oropharynx. There is some asymmetric thickening of the left side of the epiglottis, with effacement of the left vallecula and piriform sinus. 2. Area of enhancement noted within the right middle cranial fossa, incompletely assessed on this exam. It has a globular configuration, and aneurysm is not excluded. This would be better assessed with CTA. 3. Area of decreased attenuation within the left middle cranial fossa may reflect an arachnoid cyst. Again, it is incompletely assessed on this exam. Radiation dose reduction techniques were utilized, including automated exposure control and exposure modulation based on body size.   This report was finalized on 1/6/2024 2:10 AM by Dr. Joycelyn Santamaria M.D on Workstation: [id number]           Assessment & Plan   Assessment / Plan     Brief Patient Summary:  Hope Messina is a 49 y.o. male who is being admitted observation unit for further evaluation of peritonsillar abscess, COVID, abnormal finding on imaging.    Active Hospital Problems:  Active Hospital Problems    Diagnosis     **Peritonsillar abscess      Plan:     Peritonsillar abscess  -CT soft tissue neck with contrast shows asymmetric enlargement and heterogenous enhancement of the left pharyngeal mucosal space, low-attenuation area within this region which may reflect peritonsillar abscess  -WBC 11.65--> 9.34  -Decadron 8 mg every 8 hours  -Unasyn 3 g every 8 hours  -Consult ENT, Dr. Burton  -N.p.o.    COVID-19  -Symptoms started 3 to 4 days  ago  -Supportive care    Incidental abnormal finding on imaging  -Area of enhancement noted within the right middle cranial fossa, incompletely assessed, globular configuration, aneurysm occluded  -Obtain CTA head and neck for further evaluation    Asthma  -No acute exacerbation at time of admission  -Continue to monitor      DVT prophylaxis:  Mechanical DVT prophylaxis orders are present.    CODE STATUS:    Level Of Support Discussed With: Patient  Code Status (Patient has no pulse and is not breathing): CPR (Attempt to Resuscitate)  Medical Interventions (Patient has pulse or is breathing): Full Support    Admission Status:  I believe this patient meets observation status.     75 minutes has been spent by [facility] providers in the care of this patient while under observation status    I wore an appropriate PPE during this patient encounter.  Hand hygeine performed before and after seeing the patient.    Electronically signed by PRESLEY Torres, 01/06/24, 7:33 AM EST.

## 2024-01-06 NOTE — OUTREACH NOTE
Prep Survey      Flowsheet Row Responses   [facility] patient discharged from? [place]   Is LACE score < 7 ? Yes   Eligibility [facility]   Date of Admission 01/05/24   Date of Discharge 01/06/24   Discharge Disposition Home or Self Care   Discharge diagnosis Peritonsillar abscessCOVID-19   Does the patient have one of the following disease processes/diagnoses(primary or secondary)? Other   Does the patient have Home health ordered? No   Is there a DME ordered? No   Prep survey completed? Yes            BEV RITTER - Registered Nurse

## 2024-01-06 NOTE — ED TRIAGE NOTES
Patient to ED per PV from home w/ reports of sore throat, subjective fevers for 2-3 days. Patient denies exposure to known illness.

## 2024-01-06 NOTE — DISCHARGE SUMMARY
ED OBSERVATION PROGRESS/DISCHARGE SUMMARY    Date of Admission: 1/5/2024   LOS: 0 days   PCP: Niya Marcum MD    Subjective patient reports feeling well this afternoon, voices no complaints.  He is eager to go home.  States his throat feels improved.  He denies shortness of breath, chest pain, palpitations.  He is handling his own secretions.    Hospital Outcome: 49-year-old male admitted to the observation unit for further evaluation of peritonsillar abscess, COVID.  CT soft tissue of neck in ED shows asymmetric enlargement and heterogenous enhancement of left pharyngeal mucosal space, low-attenuation area which may reflect peritonsillar abscess.  Patient was started on Unasyn and Decadron in ED.  Patient was seen by ENT this morning, Dr. Burton.  He was able to drain abscess, approximately 8 mL of purulent material was aspirated.  ENT recommended 24 hours of IV antibiotics.  However, patient wants to go home.  I discussed risks versus benefits with patient.  I told him there is a risk for recurrence or worsening infection if p.o. antibiotics are not substantial and he may have to present back to ED.  Patient expresses understanding but would still like to be discharged home this evening.  I am going to discharge patient on Augmentin 875mg 1 tab p.o. every 12 hours for 14 days.  Patient to follow-up with ENT, I placed ambulatory referral.  Discussed with patient returning to ER for worsening symptoms, especially if he is unable to handle his own secretions or if he develops any shortness of breath of voice changes.    Patient also tested positive for COVID.  He states he feels like his sore throat started for 5 days ago.  However, this may have been developing peritonsillar abscess and patient just happens to have COVID at same time.  After discussing with patient, he has requested Paxlovid at discharge.  I also sent this prescription to his pharmacy.  I discussed with patient quarantining for 5 more days at  home.  Patient expresses understanding and is in agreement with plan.    Of note, there was mention of an area of enhancement noticed within the right middle cranial fossa, incompletely assessed on this exam there was concern for globular configuration and aneurysm not excluded.  Patient has CTA head and neck, I discussed images with Dr. Pandey.  This finding from CT soft tissue of neck does not represent aneurysm.  Patient does have an arachnoid cyst in the anterior inferior medial aspect of the left middle cranial fossa, felt to be of no acute significance.    ROS:  General: no fevers, chills  Respiratory: no cough, dyspnea  Cardiovascular: no chest pain, palpitations  Abdomen: No abdominal pain, nausea, vomiting, or diarrhea  Neurologic: No focal weakness    Objective   Physical Exam:  I have reviewed the vital signs.  Temp:  [98.1 °F (36.7 °C)-100 °F (37.8 °C)] 98.1 °F (36.7 °C)  Heart Rate:  [] 93  Resp:  [12-18] 15  BP: (118-138)/(84-97) 136/94  General Appearance:    Alert, cooperative, no distress  Head:    Normocephalic, atraumatic  Eyes:    Sclerae anicteric  Neck:   Supple, no mass  Lungs: Clear to auscultation bilaterally, respirations unlabored  Heart: Regular rate and rhythm, S1 and S2 normal, no murmur, rub or gallop  Abdomen:  Soft, nontender, bowel sounds active, nondistended  Extremities: No clubbing, cyanosis, or edema to lower extremities  Pulses:  2+ and symmetric in distal lower extremities  Skin: No rashes   Neurologic: Oriented x3, Normal strength to extremities    Results Review:    I have reviewed the labs, radiology results and diagnostic studies.    Results from last 7 days   Lab Units 01/06/24  0622   WBC 10*3/mm3 9.34   HEMOGLOBIN g/dL 15.1   HEMATOCRIT % 44.4   PLATELETS 10*3/mm3 373     Results from last 7 days   Lab Units 01/05/24  2327   SODIUM mmol/L 133*   POTASSIUM mmol/L 4.2   CHLORIDE mmol/L 98   CO2 mmol/L 22.4   BUN mg/dL 10   CREATININE mg/dL 0.93   CALCIUM mg/dL 9.3    BILIRUBIN mg/dL 0.4   ALK PHOS U/L 76   ALT (SGPT) U/L 31   AST (SGOT) U/L 33   GLUCOSE mg/dL 120*     Imaging Results (Last 24 Hours)       Procedure Component Value Units Date/Time    CT Angiogram Head [[id number]] Collected: 01/06/24 1132     Updated: 01/06/24 1325    Narrative:      CONTRAST-ENHANCED CT ANGIOGRAM OF THE HEAD AND NECK ON 01/06/2024     CLINICAL HISTORY: Area of enhancement noted in the right middle cranial  fossa.     TECHNIQUE: Spiral CT images were obtained from the base of the skull to  the vertex both pre and postintravenous contrast. The images were  reformatted and submitted in 3 mm thick axial, sagittal and coronal CT  sections with brain algorithm. Additional spiral CT angiogram images  were obtained from the top of the aortic arch up through the great  vessels of the head and neck during the arterial phase of contrast and  images were reformatted and submitted in 1 mm thick axial, sagittal and  coronal CT sections with soft tissue algorithm and 3D reconstructions  were performed to complete the CT angiogram of the head and neck.     COMPARISON: This is correlated to neck CT earlier this morning on  01/06/2024 at 1:25 a.m.     FINDINGS:  HEAD CT: The brain parenchyma is normal in attenuation. The ventricles  are normal in size. There is a small arachnoid cyst in the anterior  inferior medial aspect of the left middle cranial fossa that measures  approximately 3.7 x 2 x 2.5 cm in anterior posterior, medial lateral and  craniocaudal dimension, has minimal mass effect on the anterior  inferomedial left temporal lobe and is felt to be of no acute  significance. Otherwise no extra-axial fluid collections are identified.  There is no evidence of acute intracranial hemorrhage, no abnormal areas  of enhancement are seen in the head. The calvarium and skull base are  normal in appearance. There is some minimal ethmoid sinus mucosal  thickening, otherwise the paranasal sinuses, mastoid air cells  and  middle ear cavities are clear. There is mild prominence in size in the  adenoids and the nasopharynx. Furthermore there is abnormal enlargement  of the [place] tonsils, some striated enhancement indicating  inflammatory change and there is an ovoid focus of low density in the  anterior aspect of the left [place] tonsil that measures 13 x 9 x 15 mm  in the anterior posterior, medial lateral and craniocaudal dimension,  does not have a circumferential peripherally enhancing capsule and is  compatible with dense phlegmon and a developing abscess. There is some  adjacent edema extending into the submucosal region of the lateral oral  and hypopharynx and there is some effacement of the left vallecula and  piriform sinus and some localized low-density posterior to the  supraglottic airway seen on axial CT images 130-135, may be some  phlegmonous change. There is also some retropharyngeal edema. The true  cords and subglottic airway are normal in appearance. The thyroid gland  is unremarkable, the lung apices are clear. The parotid,   parapharyngeal and submandibular spaces are symmetric and are normal in  appearance. There is some mild lymphadenopathy, mildly enlarged  suprahyoid left jugulodigastric chain lymph nodes and posterior cervical  triangle lymph nodes. There is reversal of the normal cervical lordosis  from C4 to C6 with some disc space narrowing and degenerative endplate  changes at C4-5 and C5-6 and there is posterior spurring and some  uncovertebral joint hypertrophy with mild canal and right foraminal  narrowing at C4-5 and there is mild canal and right foraminal narrowing  and there is mild to moderate left bony foraminal narrowing at C5-6.  Otherwise cervical spine is unremarkable. 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 The right vertebral  artery origin and proximal right vertebral artery are obscured by beam  hardening artifact off the densely opacified adjacent veins. The  cervical segment of the right vertebral artery is extremely tiny in  diameter but appears to be patent. The right common carotid origin is  normal in appearance, no stenosis is seen in the right common carotid  artery and its bifurcation into the right internal and external carotid  arteries is normal appearance and no stenosis is seen in the cervical  segment of the right internal carotid artery using the NASCET criteria.  The left common carotid origin is normal in appearance and no stenosis  is seen in the left common carotid artery and its bifurcation into left  internal and external carotid arteries is normal in appearance and no  stenosis is seen in the cervical segment of the left internal carotid  artery using the NASCET criteria. The left subclavian artery origin is  normal in appearance, no stenosis is seen in the left subclavian artery.  The left vertebral artery origin is normal in appearance, the left  vertebral artery is the dominant vertebral artery and is widely patent  from its origin to the vertebrobasilar junction without stenosis.     CT ANGIOGRAM OF THE HEAD: The tiny diameter right vertebral artery  pierces the dura and extends intracranially and gives off the right  posterior inferior cerebellar artery with an atretic post PICA segment.  The dominant left vertebral artery is widely patent to the  vertebrobasilar junction. The basilar artery and the basilar tip is  normal in appearance. The posterior cerebral and superior cerebellar  arteries are normal in appearance. The upper cervical, petrous,  cavernous and supracavernous segments of the internal carotid arteries  are normal in appearance. The A1 segments of the anterior cerebral  arteries and the  anterior communicating artery origin and A2 segments of  the anterior cerebral arteries are within normal limits. The M1 segments  of the middle cerebral arteries and the middle cerebral artery  bifurcations are within normal limits.       Impression:      1. CT of the head demonstrates an arachnoid cyst in the anterior  inferior medial aspect of left middle cranial fossa that measures 3.6 x  2 x 2.5 cm and has mild mass effect on the anterior inferior medial left  temporal lobe and is felt to be of no acute significance. Otherwise, the  head CT is normal.     2. There is reversal of normal cervical lordosis from C4 to C6 with disc  space narrowing, degenerative endplate changes at C4-5 and C5-6,  posterior spurs and uncovertebral joint spurs contribute to mild canal  and right foraminal narrowing at C4-5 and there is mild canal, mild  right, mild to moderate left bony foraminal narrowing at C5-6.     3. This patient has mild prominence in size of the adenoids and has  enlarged [place] tonsils that have striated enhancement indicating they  are inflamed and the left [place] tonsil is larger than the right and  within the anterior lateral aspect of the enlarged left [place] tonsil  is a 13 x 9 x 15 mm ovoid area of low density that is dense phlegmon or  possible developing abscess and there is adjacent submucosal edema  tracking the submucosal region of the left lateral oral and hypopharynx  and posterior to the laryngeal airway with effacement of the left  vallecula and piriform sinus and there is some retropharyngeal edema and  there is some adenopathy in the left jugulodigastric and posterior  cervical chains that is likely reactive adenopathy. ENT consultation is  warranted.     4. 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  Overall, I see no  stenosis in the great vessels of the neck and the CT  angiogram of the head is within normal limits with no intracranial  vascular stenosis or aneurysms identified.     Radiation dose reduction techniques were utilized, including automated  exposure control and exposure modulation based on body size.        This report was finalized on 1/6/2024 1:22 PM by Dr. Cornell Pandey M.D on  Workstation: [id number]       CT Angiogram Neck [[id number]] Collected: 01/06/24 1132     Updated: 01/06/24 1325    Narrative:      CONTRAST-ENHANCED CT ANGIOGRAM OF THE HEAD AND NECK ON 01/06/2024     CLINICAL HISTORY: Area of enhancement noted in the right middle cranial  fossa.     TECHNIQUE: Spiral CT images were obtained from the base of the skull to  the vertex both pre and postintravenous contrast. The images were  reformatted and submitted in 3 mm thick axial, sagittal and coronal CT  sections with brain algorithm. Additional spiral CT angiogram images  were obtained from the top of the aortic arch up through the great  vessels of the head and neck during the arterial phase of contrast and  images were reformatted and submitted in 1 mm thick axial, sagittal and  coronal CT sections with soft tissue algorithm and 3D reconstructions  were performed to complete the CT angiogram of the head and neck.     COMPARISON: This is correlated to neck CT earlier this morning on  01/06/2024 at 1:25 a.m.     FINDINGS:  HEAD CT: The brain parenchyma is normal in attenuation. The ventricles  are normal in size. There is a small arachnoid cyst in the anterior  inferior medial aspect of the left middle cranial fossa that measures  approximately 3.7 x 2 x 2.5 cm in anterior posterior, medial lateral and  craniocaudal dimension, has minimal mass effect on the anterior  inferomedial left temporal lobe and is felt to be of no acute  significance. Otherwise no extra-axial fluid collections are identified.  There is no evidence of acute intracranial  hemorrhage, no abnormal areas  of enhancement are seen in the head. The calvarium and skull base are  normal in appearance. There is some minimal ethmoid sinus mucosal  thickening, otherwise the paranasal sinuses, mastoid air cells and  middle ear cavities are clear. There is mild prominence in size in the  adenoids and the nasopharynx. Furthermore there is abnormal enlargement  of the [place] tonsils, some striated enhancement indicating  inflammatory change and there is an ovoid focus of low density in the  anterior aspect of the left [place] tonsil that measures 13 x 9 x 15 mm  in the anterior posterior, medial lateral and craniocaudal dimension,  does not have a circumferential peripherally enhancing capsule and is  compatible with dense phlegmon and a developing abscess. There is some  adjacent edema extending into the submucosal region of the lateral oral  and hypopharynx and there is some effacement of the left vallecula and  piriform sinus and some localized low-density posterior to the  supraglottic airway seen on axial CT images 130-135, may be some  phlegmonous change. There is also some retropharyngeal edema. The true  cords and subglottic airway are normal in appearance. The thyroid gland  is unremarkable, the lung apices are clear. The parotid,   parapharyngeal and submandibular spaces are symmetric and are normal in  appearance. There is some mild lymphadenopathy, mildly enlarged  suprahyoid left jugulodigastric chain lymph nodes and posterior cervical  triangle lymph nodes. There is reversal of the normal cervical lordosis  from C4 to C6 with some disc space narrowing and degenerative endplate  changes at C4-5 and C5-6 and there is posterior spurring and some  uncovertebral joint hypertrophy with mild canal and right foraminal  narrowing at C4-5 and there is mild canal and right foraminal narrowing  and there is mild to moderate left bony foraminal narrowing at C5-6.  Otherwise cervical  spine is unremarkable. 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 The right vertebral  artery origin and proximal right vertebral artery are obscured by beam  hardening artifact off the densely opacified adjacent veins. The  cervical segment of the right vertebral artery is extremely tiny in  diameter but appears to be patent. The right common carotid origin is  normal in appearance, no stenosis is seen in the right common carotid  artery and its bifurcation into the right internal and external carotid  arteries is normal appearance and no stenosis is seen in the cervical  segment of the right internal carotid artery using the NASCET criteria.  The left common carotid origin is normal in appearance and no stenosis  is seen in the left common carotid artery and its bifurcation into left  internal and external carotid arteries is normal in appearance and no  stenosis is seen in the cervical segment of the left internal carotid  artery using the NASCET criteria. The left subclavian artery origin is  normal in appearance, no stenosis is seen in the left subclavian artery.  The left vertebral artery origin is normal in appearance, the left  vertebral artery is the dominant vertebral artery and is widely patent  from its origin to the vertebrobasilar junction without stenosis.     CT ANGIOGRAM OF THE HEAD: The tiny diameter right vertebral artery  pierces the dura and extends intracranially and gives off the right  posterior inferior cerebellar artery with an atretic post PICA segment.  The dominant left vertebral artery is widely patent to the  vertebrobasilar junction. The basilar artery and the basilar tip is  normal in appearance. The posterior cerebral and superior  cerebellar  arteries are normal in appearance. The upper cervical, petrous,  cavernous and supracavernous segments of the internal carotid arteries  are normal in appearance. The A1 segments of the anterior cerebral  arteries and the anterior communicating artery origin and A2 segments of  the anterior cerebral arteries are within normal limits. The M1 segments  of the middle cerebral arteries and the middle cerebral artery  bifurcations are within normal limits.       Impression:      1. CT of the head demonstrates an arachnoid cyst in the anterior  inferior medial aspect of left middle cranial fossa that measures 3.6 x  2 x 2.5 cm and has mild mass effect on the anterior inferior medial left  temporal lobe and is felt to be of no acute significance. Otherwise, the  head CT is normal.     2. There is reversal of normal cervical lordosis from C4 to C6 with disc  space narrowing, degenerative endplate changes at C4-5 and C5-6,  posterior spurs and uncovertebral joint spurs contribute to mild canal  and right foraminal narrowing at C4-5 and there is mild canal, mild  right, mild to moderate left bony foraminal narrowing at C5-6.     3. This patient has mild prominence in size of the adenoids and has  enlarged [place] tonsils that have striated enhancement indicating they  are inflamed and the left [place] tonsil is larger than the right and  within the anterior lateral aspect of the enlarged left [place] tonsil  is a 13 x 9 x 15 mm ovoid area of low density that is dense phlegmon or  possible developing abscess and there is adjacent submucosal edema  tracking the submucosal region of the left lateral oral and hypopharynx  and posterior to the laryngeal airway with effacement of the left  vallecula and piriform sinus and there is some retropharyngeal edema and  there is some adenopathy in the left jugulodigastric and posterior  cervical chains that is likely reactive adenopathy. ENT consultation is  warranted.      4. 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  Overall, I see no stenosis in the great vessels of the neck and the CT  angiogram of the head is within normal limits with no intracranial  vascular stenosis or aneurysms identified.     Radiation dose reduction techniques were utilized, including automated  exposure control and exposure modulation based on body size.        This report was finalized on 1/6/2024 1:22 PM by Dr. Cornell Pandey M.D on  Workstation: [id number]       CT Soft Tissue Neck With Contrast [[id number]] Collected: 01/06/24 0156     Updated: 01/06/24 0213    Narrative:      CT OF THE NECK SOFT TISSUE WITH CONTRAST     HISTORY: Sore throat and fever     COMPARISON: None available.     TECHNIQUE: Axial CT imaging was obtained through the neck. IV contrast  was administered.     FINDINGS:  Within the left middle cranial fossa, there is an area of decreased  attenuation. This may reflect an arachnoid cyst. There is also an area  of enhancement within the right temporal lobe, of uncertain etiology. It  is incompletely assessed on this exam. It does have a rounded  configuration, an aneurysm is not excluded. Consider further assessment  with CT angiography of the head. Orbits appear unremarkable. There is  extensive opacification of the ethmoid sinuses, as well as mucosal  thickening within the maxillary sinuses, associated with sclerosis of  the walls of the maxillary sinuses. Appearance is most in keeping with  chronic sinus disease. There is also evidence of prior sinus surgery.  Trace fluid is noted within the mastoid air cells bilaterally. There is  asymmetric enlargement of the left pharyngeal mucosal space, which  enhances heterogeneously. There does appear to be a collection within  this area, measuring up to 1.1 x 0.9 cm on the axial  series. It measures  up to 1.7 cm in craniocaudal dimensions, and is suspicious for a  peritonsillar abscess. There is mass effect upon the nasopharynx and  oropharynx, and edema also involves the left parapharyngeal space. The  left side of the epiglottis is mildly thickened. There is fluid and  debris which is noted within the left vallecula and piriform sinus.  Vocal cords appear intact. Trachea and esophagus appear unremarkable.  Thyroid gland is normal. No acute infiltrates are noted within the  visualized lungs. Images do suggest some dilatation of the aortic root,  measuring up to 4.2 cm, incompletely assessed on this exam. Right  vertebral artery is extremely small in caliber. It is poorly assessed on  a nonangiographic protocol CT. There does appear to be some prevertebral  edema. Prominent cervical lymph nodes are noted, likely reactive.       Impression:         1. The patient has asymmetric enlargement and heterogeneous enhancement  of the left pharyngeal mucosal space. There is a low-attenuation area  within this region, which may reflect peritonsillar abscess. Edema also  extends into the left parapharyngeal space, and there does appear to be  some prevertebral edema. There is mass effect upon the nasopharynx and  oropharynx. There is some asymmetric thickening of the left side of the  epiglottis, with effacement of the left vallecula and piriform sinus.  2. Area of enhancement noted within the right middle cranial fossa,  incompletely assessed on this exam. It has a globular configuration, and  aneurysm is not excluded. This would be better assessed with CTA.  3. Area of decreased attenuation within the left middle cranial fossa  may reflect an arachnoid cyst. Again, it is incompletely assessed on  this exam.  Radiation dose reduction techniques were utilized, including automated  exposure control and exposure modulation based on body size.        This report was finalized on 1/6/2024 2:10 AM by   Joycelyn Santamaria M.D on Workstation: [id number]               I have reviewed the medications.  ---------------------------------------------------------------------------------------------  Assessment & Plan   Assessment/Problem List    Peritonsillar abscess      Plan:    Peritonsillar abscess  -CT soft tissue neck with contrast shows asymmetric enlargement and heterogenous enhancement of the left pharyngeal mucosal space, low-attenuation area within this region which may reflect peritonsillar abscess  -WBC 11.65--> 9.34  -Decadron 8 mg every 8 hours  -Unasyn 3 g every 8 hours  -Consult ENT, Dr. Burton  -Patient would like to discharge home this evening, I discussed risks of discontinuing IV antibiotics at this time but patient would still like to be discharged home  -Augmentin 875 twice daily for 14 days  -Follow-up with ENT outpatient     COVID-19  -Symptoms started 3 to 4 days ago  -Supportive care  -Paxlovid for 5 days at discharge  -Educated patient not to take Advair Diskus while on Paxlovid     Incidental abnormal finding on imaging  Arachnoid cyst  -Area of enhancement noted within the right middle cranial fossa, incompletely assessed, globular configuration, aneurysm not excluded  -CT head and neck negative for aneurysm  -Arachnoid cyst anterior inferior medial aspect of left middle cranial fossa     Asthma  -No acute exacerbation at time of admission  -Continue to monitor    Disposition: Home    Follow-up after Discharge: PCP, ENT    This note will serve as discharge summary    PRESLEY Torres 01/06/24 16:36 EST    I have worn appropriate PPE during this patient encounter and sanitized my hands with entering and exiting patient room.

## 2024-01-06 NOTE — DISCHARGE INSTRUCTIONS
Begin taking antibiotics as prescribed for peritonsillar abscess.  Follow-up with ENT, call their office on Monday to schedule.  Return to ER if you develop drooling or inability to swallow your own saliva, if you develop shortness of breath, worsening pain, or change to your voice.    Begin taking Paxlovid for COVID.  DO NOT TAKE ADVAIR DISKUS WHILE ON PAXLOVID, RESUME ADVAIR DISKUS INHALER 3 DAYS AFTER COMPLETING PAXLOVID.    Return to the emergency department with worsening symptoms, uncontrolled pain, inability to tolerate oral liquids, fever greater than 101°F not controlled by Tylenol or as needed with emergent concerns.

## 2024-01-06 NOTE — ED PROVIDER NOTES
MD ATTESTATION NOTE    The ZAIDA and I have discussed this patient's history, physical exam, MDM, and treatment plan.  I have reviewed the documentation and personally had a face to face interaction with the patient. I affirm the documentation and agree with the treatment and plan.  The attached note describes my personal findings.      I provided a substantive portion of the medical decision making.        Brief HPI: Patient presents with complaint of sore throat, fever and chills and painful swallowing.    PHYSICAL EXAM  ED Triage Vitals   Temp Heart Rate Resp BP SpO2   01/05/24 2218 01/05/24 2218 01/05/24 2218 01/05/24 2242 01/05/24 2218   100 °F (37.8 °C) (!) 135 18 138/94 96 %      Temp src Heart Rate Source Patient Position BP Location FiO2 (%)   01/05/24 2218 01/05/24 2218 01/05/24 2244 01/05/24 2244 --   Tympanic Monitor Lying Right arm          GENERAL: Awake and alert, no acute distress  HENT: nares patent, posterior pharyngeal erythema with asymmetric swelling of the left tonsillar pillar and soft palate, uvula midline, no significant cervical adenopathy, neck is supple  EYES: no scleral icterus, EOMI  CV: regular rhythm, normal rate  RESPIRATORY: normal effort, no wheezing or stridor  ABDOMEN: soft, nondistended  MUSCULOSKELETAL: no deformity  NEURO: alert, moves all extremities, follows commands  PSYCH:  calm, cooperative  SKIN: warm, dry    Vital signs and nursing notes reviewed.        Medical Decision Making:  ED Course as of 01/06/24 0302   Fri Jan 05, 2024   2306 I discussed the case with Dr. Hughes and they agree to evaluate the patient at the bedside.    [CC]   Sat Jan 06, 2024   0006 COVID19(!!): Detected [CC]   0012 Lactate: 0.8 [CC]   0210 CT neck soft tissue independently interpreted in PACS and appears consistent with left peritonsillar abscess. [JR]   0247 8mg q 8 hours 18g spinal needle 10cc syringe lido w/epi, culture swab. Dr. Burton [CC]   0257 Spoke with Samara, ZAIDA with the ops unit  who agrees to admit the patient to Dr. Haas.  I did discuss abnormal head CT and she will  make sure patient has scan or follow-up prior to discharge. [CC]   4324 I rechecked the patient.  I discussed the patient's labs, radiology findings (including all incidental findings), diagnosis, and plan for admission. The patient understands and agrees with the plan.   [CC]      ED Course User Index  [CC] Olga Lidia Nagy PA-C  [JR] Pb Hughes MD         SHARED VISIT: This visit was performed by BOTH a physician and an APC. The substantive portion of the medical decision making was performed by this attesting physician who made or approved the management plan and takes responsibility for patient management. All studies in the APC note (if performed) were independently interpreted by me.          Pb Hughes MD  01/06/24 1403

## 2024-01-06 NOTE — CONSULTS
f    Referring Provider: ED  Reason for Consultation: left peritonsillar abscess    Patient Care Team:  Niya Marcum MD as PCP - General (Internal Medicine)    Chief complaint left peritonsillar abscess    Subjective .     History of present illness:  left peritonsillar abscess    Review of Systems  Pertinent items are noted in HPI, all other systems reviewed and negative    History  Past Medical History:   Diagnosis Date    Asthma    ,   Past Surgical History:   Procedure Laterality Date    COLONOSCOPY N/A 11/2/2023    Procedure: COLONOSCOPY;  Surgeon: Abdoul Yeboah MD;  Location: [facility];  Service: Gastroenterology;  Laterality: N/A;  External hemorrhoids    HAMMER TOE REPAIR      SINUS SURGERY     ,   Family History   Problem Relation Age of Onset    Malig Hyperthermia Neg Hx    ,   Social History     Socioeconomic History    Marital status:    Tobacco Use    Smoking status: Never     Passive exposure: Never    Smokeless tobacco: Never   Vaping Use    Vaping Use: Never used   Substance and Sexual Activity    Alcohol use: No    Drug use: No    Sexual activity: Defer     E-cigarette/Vaping    E-cigarette/Vaping Use Never User      E-cigarette/Vaping Substances     E-cigarette/Vaping Devices         ,   Medications Prior to Admission   Medication Sig Dispense Refill Last Dose    Dupixent 300 MG/2ML solution prefilled syringe    Past Month    Advair Diskus 250-50 MCG/ACT DISKUS    More than a month    fluticasone (FLONASE) 50 MCG/ACT nasal spray    More than a month    fluticasone-salmeterol (ADVAIR HFA) 45-21 MCG/ACT inhaler Inhale 2 puffs 2 (Two) Times a Day.      , Scheduled Meds:  ampicillin-sulbactam, 3 g, Intravenous, Q8H  budesonide-formoterol, 2 puff, Inhalation, BID - RT  dexAMETHasone, 8 mg, Intravenous, Q8H  lidocaine 1% - EPINEPHrine 1:[id number], 10 mL, Injection, Once  senna-docusate sodium, 2 tablet, Oral, BID  sodium chloride, 10 mL, Intravenous, Q12H   , Continuous Infusions:  lactated  ringers, 100 mL/hr   , PRN Meds:    senna-docusate sodium **AND** polyethylene glycol **AND** bisacodyl **AND** bisacodyl    ketorolac    Lidocaine Viscous HCl    ondansetron ODT **OR** ondansetron    sodium chloride    sodium chloride    sodium chloride, and Allergies:  Patient has no known allergies.    Objective     Vital Signs   Temp:  [98.2 °F (36.8 °C)-100 °F (37.8 °C)] 98.2 °F (36.8 °C)  Heart Rate:  [] 94  Resp:  [12-18] 12  BP: (118-138)/(84-94) 129/85    Physical Exam:   No trismus, left peritonsillar abscess    Results Review:   I reviewed the patient's new clinical results.  I reviewed the patient's new imaging results and agree with the interpretation.      Assessment & Plan       Peritonsillar abscess      Left peritonsillar abscess, I and D done at bedside with 18 zehra spinal needle, 8 cc pus aspirated, well tolerated by the patient.    It would be best to give 24 hours of IV abx, however the patient wants to go home, if he goes home would Rx. Amoxicillin / Clavulanate 875 1 p.o. q 12 hrs for the PTA    He can f.u. with Dr. Alvarez or Dr. Collins here at [facility]    I discussed the patients findings and my recommendations with patient    Pb Burton III, MD  01/06/24  08:16 EST    Time:  30 minutes

## 2024-01-07 LAB — BACTERIA SPEC AEROBE CULT: NORMAL

## 2024-01-08 ENCOUNTER — TRANSITIONAL CARE MANAGEMENT TELEPHONE ENCOUNTER (OUTPATIENT)
Dept: CALL CENTER | Facility: HOSPITAL | Age: 49
End: 2024-01-08
Payer: MEDICAID

## 2024-01-08 LAB
BACTERIA SPEC AEROBE CULT: ABNORMAL
BACTERIA SPEC AEROBE CULT: ABNORMAL
GRAM STN SPEC: ABNORMAL
ISOLATED FROM: ABNORMAL

## 2024-01-08 NOTE — OUTREACH NOTE
Call Center TCM Note      Flowsheet Row Responses   [facility] patient discharged from? [facility]   Does the patient have one of the following disease processes/diagnoses(primary or secondary)? Other   TCM attempt successful? Yes   Call start time 1623   Call end time 1625   Discharge diagnosis Peritonsillar abscessCOVID-19   Meds reviewed with patient/caregiver? Yes   Is the patient having any side effects they believe may be caused by any medication additions or changes? No   Does the patient have all medications ordered at discharge? Yes   Is the patient taking all medications as directed (includes completed medication regime)? Yes   Comments Only 1 TCM appt but pt could not do that and only wants to see PCP.   Does the patient have an appointment with their PCP within 7-14 days of discharge? No   Nursing Interventions Routed TCM call to PCP office   Has home health visited the patient within 72 hours of discharge? N/A   Psychosocial issues? No   Did the patient receive a copy of their discharge instructions? Yes   Nursing interventions Reviewed instructions with patient   What is the patient's perception of their health status since discharge? Improving   TCM call completed? Yes   Call end time 1625            Mary Camacho RN    1/8/2024, 16:26 EST

## 2024-01-08 NOTE — OUTREACH NOTE
Call Center TCM Note      Flowsheet Row Responses   [facility] patient discharged from? [facility]   Does the patient have one of the following disease processes/diagnoses(primary or secondary)? Other   TCM attempt successful? No  [None]   Unsuccessful attempts Attempt 1            Mary Camacho RN    1/8/2024, 15:27 EST

## 2024-01-11 ENCOUNTER — OFFICE VISIT (OUTPATIENT)
Dept: FAMILY MEDICINE CLINIC | Facility: CLINIC | Age: 49
End: 2024-01-11
Payer: MEDICAID

## 2024-01-11 VITALS
DIASTOLIC BLOOD PRESSURE: 72 MMHG | HEART RATE: 87 BPM | TEMPERATURE: 97.5 F | WEIGHT: 223 LBS | SYSTOLIC BLOOD PRESSURE: 120 MMHG | BODY MASS INDEX: 27.73 KG/M2 | HEIGHT: 75 IN | OXYGEN SATURATION: 96 %

## 2024-01-11 DIAGNOSIS — G93.0 BRAIN CYST: ICD-10-CM

## 2024-01-11 DIAGNOSIS — J36 PERITONSILLAR ABSCESS: Primary | ICD-10-CM

## 2024-01-11 LAB — BACTERIA SPEC AEROBE CULT: NORMAL

## 2024-01-11 RX ORDER — AMOXICILLIN AND CLAVULANATE POTASSIUM 875; 125 MG/1; MG/1
1 TABLET, FILM COATED ORAL 2 TIMES DAILY
Qty: 10 TABLET | Refills: 0 | Status: SHIPPED | OUTPATIENT
Start: 2024-01-11 | End: 2024-01-16

## 2024-01-11 NOTE — PROGRESS NOTES
Shira Messina is a 49 y.o. male.     Chief Complaint   Patient presents with    Peritonsillar abscess    [facility] FOLLOW UP        History of Present Illness   Patient was admitted to [facility] for 1 day for tonsillar abscess, COVID-19.  He had an I&D done by Dr. Zuniga.  He was put on Augmentin and sent home.  Reports he is much better.  However reviewing the chart CAT scan of the head showed arachnoid cyst.  It has some mass effect.  No MRI was done.  Patient has no headaches.  The following portions of the patient's history were reviewed and updated as appropriate: allergies, current medications, past family history, past medical history, past social history, past surgical history and problem list.    Review of Systems   Constitutional:  Negative for activity change, appetite change, fatigue and fever.   HENT: Negative.     Eyes:  Negative for blurred vision and double vision.   Respiratory:  Negative for shortness of breath.    Cardiovascular:  Negative for chest pain, palpitations and leg swelling.   Genitourinary: Negative.    Neurological:  Negative for dizziness, syncope, light-headedness and headache.       No Known Allergies    Current Outpatient Medications on File Prior to Visit   Medication Sig Dispense Refill    Dupixent 300 MG/2ML solution prefilled syringe       Nirmatrelvir & Ritonavir, 300mg/100mg, (PAXLOVID) 20 x 150 MG & 10 x 100MG tablet therapy pack tablet Take 3 tablets by mouth 2 (Two) Times a Day for 5 days. 30 tablet 0    [DISCONTINUED] amoxicillin-clavulanate (AUGMENTIN) 875-125 MG per tablet Take 1 tablet by mouth 2 (Two) Times a Day for 14 days. 28 tablet 0    Advair Diskus 250-50 MCG/ACT DISKUS DO NOT USE ADVAIR WHILE ON PAXLOVID, RESUME 3 DAYS AFTER COMPLETING PAXLOVID (Patient not taking: Reported on 1/11/2024)      fluticasone (FLONASE) 50 MCG/ACT nasal spray  (Patient not taking: Reported on 1/11/2024)       No current facility-administered medications  "on file prior to visit.       Family History   Problem Relation Age of Onset    Malig Hyperthermia Neg Hx        Past Medical History:   Diagnosis Date    Asthma        Past Surgical History:   Procedure Laterality Date    COLONOSCOPY N/A 11/2/2023    Procedure: COLONOSCOPY;  Surgeon: Abdoul Yeboah MD;  Location: [facility];  Service: Gastroenterology;  Laterality: N/A;  External hemorrhoids    HAMMER TOE REPAIR      SINUS SURGERY         Social History     Socioeconomic History    Marital status:    Tobacco Use    Smoking status: Never     Passive exposure: Never    Smokeless tobacco: Never   Vaping Use    Vaping Use: Never used   Substance and Sexual Activity    Alcohol use: No    Drug use: No    Sexual activity: Defer       Patient Active Problem List   Diagnosis    Moderate asthma with acute exacerbation    Encounter for screening for malignant neoplasm of colon    Peritonsillar abscess       /72   Pulse 87   Temp 97.5 °F (36.4 °C)   Ht 190.5 cm (75\")   Wt 101 kg (223 lb)   SpO2 96%   BMI 27.87 kg/m²   Body mass index is 27.87 kg/m².    Objective   Physical Exam  Constitutional:       Appearance: He is well-developed.   HENT:      Mouth/Throat:      Comments: Left tonsil still swollen and red.  Eyes:      Extraocular Movements: Extraocular movements intact.      Pupils: Pupils are equal, round, and reactive to light.   Neck:      Thyroid: No thyromegaly.      Vascular: No JVD.      Trachea: No tracheal deviation.   Cardiovascular:      Rate and Rhythm: Normal rate and regular rhythm.      Heart sounds: Normal heart sounds. No murmur heard.     No friction rub. No gallop.   Pulmonary:      Effort: Pulmonary effort is normal. No respiratory distress.      Breath sounds: Normal breath sounds. No wheezing.   Abdominal:      General: Bowel sounds are normal. There is no distension.      Palpations: Abdomen is soft. There is no mass.      Tenderness: There is no abdominal tenderness. There is no " guarding or rebound.      Hernia: No hernia is present.   Musculoskeletal:         General: Normal range of motion.      Cervical back: Normal range of motion and neck supple.   Lymphadenopathy:      Cervical: No cervical adenopathy.   Neurological:      Mental Status: He is alert and oriented to person, place, and time.   Psychiatric:         Mood and Affect: Mood normal.         Behavior: Behavior normal.           Assessment & Plan   Diagnoses and all orders for this visit:    1. Peritonsillar abscess (Primary)    2. Brain cyst  -     Cancel: MRI Brain Without Contrast; Future  -     MRI Brain Without Contrast; Future    Other orders  -     amoxicillin-clavulanate (AUGMENTIN) 875-125 MG per tablet; Take 1 tablet by mouth 2 (Two) Times a Day for 5 days.  Dispense: 10 tablet; Refill: 0    Discussed with patient needs MRI of the head done.  Add 5 more days of Augmentin.  Call Dr. Burton for follow-up.  I will see him back in 2 weeks time, if MRI is not done yet postpone to 4 weeks.  MRI needs to be done at [facility] for comparison.  Do not do MRI at [facility]  EHR dragon/transcription disclaimer:  Part of this note are created by electronic transcription/translation of spoken language to printed text and thus may lead to erroneous, or at times, nonsensical words or phrases inadvertently transcribed.  Although I have reviewed for such errors, some may still exist.

## 2024-01-18 ENCOUNTER — TELEPHONE (OUTPATIENT)
Dept: FAMILY MEDICINE CLINIC | Facility: CLINIC | Age: 49
End: 2024-01-18
Payer: MEDICAID

## 2024-01-18 NOTE — TELEPHONE ENCOUNTER
Caller: Hope Messina    Relationship: Self    Best call back number: [phone]       What was the call regarding: PATIENT HAS BEEN WAITING SINCE 1/11 TO HAVE [facility] CALL TO SCHEDULE MRI. PLEASE CALL AND ADVISE PATIENT.

## 2024-02-05 ENCOUNTER — OFFICE VISIT (OUTPATIENT)
Dept: FAMILY MEDICINE CLINIC | Facility: CLINIC | Age: 49
End: 2024-02-05
Payer: MEDICAID

## 2024-02-05 VITALS
OXYGEN SATURATION: 97 % | WEIGHT: 229 LBS | BODY MASS INDEX: 28.47 KG/M2 | HEIGHT: 75 IN | DIASTOLIC BLOOD PRESSURE: 76 MMHG | HEART RATE: 96 BPM | SYSTOLIC BLOOD PRESSURE: 120 MMHG | TEMPERATURE: 97.9 F | RESPIRATION RATE: 16 BRPM

## 2024-02-05 DIAGNOSIS — J45.901 MODERATE ASTHMA WITH ACUTE EXACERBATION, UNSPECIFIED WHETHER PERSISTENT: ICD-10-CM

## 2024-02-05 DIAGNOSIS — G93.0 ARACHNOID CYST: Primary | ICD-10-CM

## 2024-02-05 PROBLEM — J36 PERITONSILLAR ABSCESS: Status: RESOLVED | Noted: 2024-01-06 | Resolved: 2024-02-05

## 2024-02-05 PROCEDURE — 99213 OFFICE O/P EST LOW 20 MIN: CPT | Performed by: INTERNAL MEDICINE

## 2024-02-05 NOTE — PROGRESS NOTES
Shira Messina is a 49 y.o. male.     Chief Complaint   Patient presents with     Brain cyst     MRI results        History of Present Illness   Seen follow-up for cyst in the brain is incidental finding.  MRI was done showed small arachnoid cyst.  No mass effect.  No measurements given.  Patient has no dizziness, vision problems, headaches.  The following portions of the patient's history were reviewed and updated as appropriate: allergies, current medications, past family history, past medical history, past social history, past surgical history and problem list.    Review of Systems   Constitutional:  Negative for activity change, appetite change, fatigue and fever.   Eyes:  Negative for blurred vision and double vision.   Respiratory: Negative.  Negative for shortness of breath.    Cardiovascular:  Negative for chest pain, palpitations and leg swelling.   Neurological:  Negative for dizziness, syncope, light-headedness and headache.       No Known Allergies    Current Outpatient Medications on File Prior to Visit   Medication Sig Dispense Refill    Dupixent 300 MG/2ML solution prefilled syringe       Advair Diskus 250-50 MCG/ACT DISKUS DO NOT USE ADVAIR WHILE ON PAXLOVID, RESUME 3 DAYS AFTER COMPLETING PAXLOVID      fluticasone (FLONASE) 50 MCG/ACT nasal spray        No current facility-administered medications on file prior to visit.       Family History   Problem Relation Age of Onset    Malig Hyperthermia Neg Hx        Past Medical History:   Diagnosis Date    Asthma        Past Surgical History:   Procedure Laterality Date    COLONOSCOPY N/A 11/2/2023    Procedure: COLONOSCOPY;  Surgeon: Abdoul Yeboah MD;  Location: [facility];  Service: Gastroenterology;  Laterality: N/A;  External hemorrhoids    HAMMER TOE REPAIR      SINUS SURGERY         Social History     Socioeconomic History    Marital status:    Tobacco Use    Smoking status: Never     Passive exposure: Never    Smokeless tobacco:  "Never   Vaping Use    Vaping Use: Never used   Substance and Sexual Activity    Alcohol use: No    Drug use: No    Sexual activity: Defer       Patient Active Problem List   Diagnosis    Moderate asthma with acute exacerbation    Encounter for screening for malignant neoplasm of colon    Arachnoid cyst       /76   Pulse 96   Temp 97.9 °F (36.6 °C)   Resp 16   Ht 190.5 cm (75\")   Wt 104 kg (229 lb)   SpO2 97%   BMI 28.62 kg/m²   Body mass index is 28.62 kg/m².    Objective   Physical Exam  Vitals and nursing note reviewed.   Constitutional:       Appearance: He is well-developed.   Eyes:      Pupils: Pupils are equal, round, and reactive to light.   Neck:      Thyroid: No thyromegaly.      Vascular: No JVD.      Trachea: No tracheal deviation.   Cardiovascular:      Rate and Rhythm: Normal rate and regular rhythm.      Heart sounds: Normal heart sounds. No murmur heard.     No friction rub. No gallop.   Pulmonary:      Effort: Pulmonary effort is normal. No respiratory distress.      Breath sounds: Normal breath sounds. No wheezing.   Abdominal:      General: Bowel sounds are normal. There is no distension.      Palpations: Abdomen is soft. There is no mass.      Tenderness: There is no abdominal tenderness. There is no guarding or rebound.      Hernia: No hernia is present.   Musculoskeletal:         General: Normal range of motion.      Cervical back: Normal range of motion and neck supple.   Lymphadenopathy:      Cervical: No cervical adenopathy.   Neurological:      General: No focal deficit present.      Mental Status: He is alert and oriented to person, place, and time. Mental status is at baseline.      Cranial Nerves: No cranial nerve deficit.      Sensory: No sensory deficit.      Motor: No weakness.      Coordination: Coordination normal.      Gait: Gait normal.      Deep Tendon Reflexes: Reflexes normal.   Psychiatric:         Mood and Affect: Mood normal.         Behavior: Behavior normal. "           Assessment & Plan   Diagnoses and all orders for this visit:    1. Arachnoid cyst (Primary)    2. Moderate asthma with acute exacerbation, unspecified whether persistent    Patient has no symptoms from arachnoid cyst.  Continue to observe at this time.  He follows with allergist, getting Dupixent that is helping him.  Reminded patient to get MRI in 1 years time.  Also he starts having symptoms.  Return as schedule needs cholesterol check.  EHR dragon/transcription disclaimer:  Part of this note are created by electronic transcription/translation of spoken language to printed text and thus may lead to erroneous, or at times, nonsensical words or phrases inadvertently transcribed.  Although I have reviewed for such errors, some may still exist.

## 2024-05-14 ENCOUNTER — OFFICE VISIT (OUTPATIENT)
Dept: FAMILY MEDICINE CLINIC | Facility: CLINIC | Age: 49
End: 2024-05-14
Payer: MEDICAID

## 2024-05-14 VITALS
RESPIRATION RATE: 16 BRPM | HEART RATE: 73 BPM | DIASTOLIC BLOOD PRESSURE: 82 MMHG | WEIGHT: 225 LBS | OXYGEN SATURATION: 97 % | SYSTOLIC BLOOD PRESSURE: 112 MMHG | HEIGHT: 75 IN | BODY MASS INDEX: 27.98 KG/M2 | TEMPERATURE: 98 F

## 2024-05-14 DIAGNOSIS — G93.0 ARACHNOID CYST: ICD-10-CM

## 2024-05-14 DIAGNOSIS — J45.901 MODERATE ASTHMA WITH ACUTE EXACERBATION, UNSPECIFIED WHETHER PERSISTENT: ICD-10-CM

## 2024-05-14 DIAGNOSIS — E78.2 MIXED HYPERLIPIDEMIA: Primary | ICD-10-CM

## 2024-05-14 PROCEDURE — 1160F RVW MEDS BY RX/DR IN RCRD: CPT | Performed by: INTERNAL MEDICINE

## 2024-05-14 PROCEDURE — 99213 OFFICE O/P EST LOW 20 MIN: CPT | Performed by: INTERNAL MEDICINE

## 2024-05-14 PROCEDURE — 1159F MED LIST DOCD IN RCRD: CPT | Performed by: INTERNAL MEDICINE

## 2024-05-14 RX ORDER — BUDESONIDE AND FORMOTEROL FUMARATE DIHYDRATE 160; 4.5 UG/1; UG/1
2 AEROSOL RESPIRATORY (INHALATION)
COMMUNITY
Start: 2024-05-07

## 2024-05-14 NOTE — PROGRESS NOTES
Shira Messina is a 49 y.o. male.     Chief Complaint   Patient presents with    Asthma   Arachnoid cyst    Asthma  There is no shortness of breath. Pertinent negatives include no appetite change, chest pain or fever. His past medical history is significant for asthma.      Patient seen follow-up for arachnoid cyst, asthma.  Patient is seeing allergist getting Dupixent.  He is also on Symbicort doing fine.  MRI was done showed arachnoid cyst patient has no chest pain no headache, no dizziness, no weakness.  Incidental finding.  The following portions of the patient's history were reviewed and updated as appropriate: allergies, current medications, past family history, past medical history, past social history, past surgical history and problem list.    Review of Systems   Constitutional:  Negative for activity change, appetite change, fatigue and fever.   Eyes:  Negative for blurred vision and double vision.   Respiratory: Negative.  Negative for shortness of breath.    Cardiovascular:  Negative for chest pain, palpitations and leg swelling.   Neurological:  Negative for dizziness, syncope, light-headedness and headache.       No Known Allergies    Current Outpatient Medications on File Prior to Visit   Medication Sig Dispense Refill    Dupixent 300 MG/2ML solution prefilled syringe       Symbicort 160-4.5 MCG/ACT inhaler Inhale 2 puffs 2 (Two) Times a Day.      [DISCONTINUED] Advair Diskus 250-50 MCG/ACT DISKUS DO NOT USE ADVAIR WHILE ON PAXLOVID, RESUME 3 DAYS AFTER COMPLETING PAXLOVID      fluticasone (FLONASE) 50 MCG/ACT nasal spray        No current facility-administered medications on file prior to visit.       Family History   Problem Relation Age of Onset    Malig Hyperthermia Neg Hx        Past Medical History:   Diagnosis Date    Asthma        Past Surgical History:   Procedure Laterality Date    COLONOSCOPY N/A 11/2/2023    Procedure: COLONOSCOPY;  Surgeon: Abdoul Yeboah MD;  Location: [facility]  "OR;  Service: Gastroenterology;  Laterality: N/A;  External hemorrhoids    HAMMER TOE REPAIR      SINUS SURGERY         Social History     Socioeconomic History    Marital status:    Tobacco Use    Smoking status: Never     Passive exposure: Never    Smokeless tobacco: Never   Vaping Use    Vaping status: Never Used   Substance and Sexual Activity    Alcohol use: No    Drug use: No    Sexual activity: Defer       Patient Active Problem List   Diagnosis    Moderate asthma with acute exacerbation    Encounter for screening for malignant neoplasm of colon    Arachnoid cyst    Mixed hyperlipidemia       /82   Pulse 73   Temp 98 °F (36.7 °C)   Resp 16   Ht 190.5 cm (75\")   Wt 102 kg (225 lb)   SpO2 97%   BMI 28.12 kg/m²   Body mass index is 28.12 kg/m².    Objective   Physical Exam  Vitals and nursing note reviewed.   Constitutional:       Appearance: He is well-developed.   Eyes:      Pupils: Pupils are equal, round, and reactive to light.   Neck:      Thyroid: No thyromegaly.      Vascular: No JVD.      Trachea: No tracheal deviation.   Cardiovascular:      Rate and Rhythm: Normal rate and regular rhythm.      Heart sounds: Normal heart sounds. No murmur heard.     No friction rub. No gallop.   Pulmonary:      Effort: Pulmonary effort is normal. No respiratory distress.      Breath sounds: Normal breath sounds. No wheezing.   Abdominal:      General: Bowel sounds are normal. There is no distension.      Palpations: Abdomen is soft. There is no mass.      Tenderness: There is no abdominal tenderness. There is no guarding or rebound.      Hernia: No hernia is present.   Musculoskeletal:         General: Normal range of motion.      Cervical back: Normal range of motion and neck supple.   Lymphadenopathy:      Cervical: No cervical adenopathy.   Neurological:      General: No focal deficit present.      Mental Status: He is alert and oriented to person, place, and time. Mental status is at baseline. "   Psychiatric:         Behavior: Behavior normal.           Assessment & Plan   Diagnoses and all orders for this visit:    1. Mixed hyperlipidemia (Primary)  -     CBC & Differential  -     Comprehensive Metabolic Panel  -     TSH  -     Lipid Panel With / Chol / HDL Ratio    2. Moderate asthma with acute exacerbation, unspecified whether persistent  -     CBC & Differential  -     Comprehensive Metabolic Panel  -     TSH  -     Lipid Panel With / Chol / HDL Ratio    Continue present, continue Symbicort.  Will order MRI needs to get it done in  Next February.  Patient will come back and see me after the MRI.  Rest of her problems are chronic and stable.  EHR dragon/transcription disclaimer:  Part of this note are created by electronic transcription/translation of spoken language to printed text and thus may lead to erroneous, or at times, nonsensical words or phrases inadvertently transcribed.  Although I have reviewed for such errors, some may still exist.

## 2024-05-14 NOTE — PROGRESS NOTES
Venipuncture Blood Specimen Collection  Venipuncture performed in right arm by Winnie Milligan MA with good hemostasis. Patient tolerated the procedure well without complications.   05/14/24   Winnie Milligan MA

## 2024-05-15 LAB
ALBUMIN SERPL-MCNC: 4.8 G/DL (ref 3.5–5.2)
ALBUMIN/GLOB SERPL: 1.3 G/DL
ALP SERPL-CCNC: 65 U/L (ref 39–117)
ALT SERPL-CCNC: 22 U/L (ref 1–41)
AST SERPL-CCNC: 19 U/L (ref 1–40)
BASOPHILS # BLD AUTO: 0.08 10*3/MM3 (ref 0–0.2)
BASOPHILS NFR BLD AUTO: 1.3 % (ref 0–1.5)
BILIRUB SERPL-MCNC: 0.7 MG/DL (ref 0–1.2)
BUN SERPL-MCNC: 12 MG/DL (ref 6–20)
BUN/CREAT SERPL: 12.2 (ref 7–25)
CALCIUM SERPL-MCNC: 9.6 MG/DL (ref 8.6–10.5)
CHLORIDE SERPL-SCNC: 99 MMOL/L (ref 98–107)
CHOLEST SERPL-MCNC: 188 MG/DL (ref 0–200)
CHOLEST/HDLC SERPL: 4.7 {RATIO}
CO2 SERPL-SCNC: 27.5 MMOL/L (ref 22–29)
CREAT SERPL-MCNC: 0.98 MG/DL (ref 0.76–1.27)
EGFRCR SERPLBLD CKD-EPI 2021: 93.9 ML/MIN/1.73
EOSINOPHIL # BLD AUTO: 1 10*3/MM3 (ref 0–0.4)
EOSINOPHIL NFR BLD AUTO: 16.7 % (ref 0.3–6.2)
ERYTHROCYTE [DISTWIDTH] IN BLOOD BY AUTOMATED COUNT: 12.6 % (ref 12.3–15.4)
GLOBULIN SER CALC-MCNC: 3.6 GM/DL
GLUCOSE SERPL-MCNC: 90 MG/DL (ref 65–99)
HCT VFR BLD AUTO: 48.2 % (ref 37.5–51)
HDLC SERPL-MCNC: 40 MG/DL (ref 40–60)
HGB BLD-MCNC: 15.6 G/DL (ref 13–17.7)
IMM GRANULOCYTES # BLD AUTO: 0.01 10*3/MM3 (ref 0–0.05)
IMM GRANULOCYTES NFR BLD AUTO: 0.2 % (ref 0–0.5)
LDLC SERPL CALC-MCNC: 129 MG/DL (ref 0–100)
LYMPHOCYTES # BLD AUTO: 2.65 10*3/MM3 (ref 0.7–3.1)
LYMPHOCYTES NFR BLD AUTO: 44.2 % (ref 19.6–45.3)
MCH RBC QN AUTO: 28.9 PG (ref 26.6–33)
MCHC RBC AUTO-ENTMCNC: 32.4 G/DL (ref 31.5–35.7)
MCV RBC AUTO: 89.4 FL (ref 79–97)
MONOCYTES # BLD AUTO: 0.61 10*3/MM3 (ref 0.1–0.9)
MONOCYTES NFR BLD AUTO: 10.2 % (ref 5–12)
NEUTROPHILS # BLD AUTO: 1.64 10*3/MM3 (ref 1.7–7)
NEUTROPHILS NFR BLD AUTO: 27.4 % (ref 42.7–76)
NRBC BLD AUTO-RTO: 0 /100 WBC (ref 0–0.2)
PLATELET # BLD AUTO: 346 10*3/MM3 (ref 140–450)
POTASSIUM SERPL-SCNC: 4.3 MMOL/L (ref 3.5–5.2)
PROT SERPL-MCNC: 8.4 G/DL (ref 6–8.5)
RBC # BLD AUTO: 5.39 10*6/MM3 (ref 4.14–5.8)
SODIUM SERPL-SCNC: 137 MMOL/L (ref 136–145)
TRIGL SERPL-MCNC: 104 MG/DL (ref 0–150)
TSH SERPL DL<=0.005 MIU/L-ACNC: 0.63 UIU/ML (ref 0.27–4.2)
VLDLC SERPL CALC-MCNC: 19 MG/DL (ref 5–40)
WBC # BLD AUTO: 5.99 10*3/MM3 (ref 3.4–10.8)

## 2024-12-11 ENCOUNTER — OFFICE VISIT (OUTPATIENT)
Dept: FAMILY MEDICINE CLINIC | Facility: CLINIC | Age: 49
End: 2024-12-11
Payer: MEDICAID

## 2024-12-11 VITALS
RESPIRATION RATE: 16 BRPM | SYSTOLIC BLOOD PRESSURE: 118 MMHG | OXYGEN SATURATION: 98 % | HEIGHT: 75 IN | DIASTOLIC BLOOD PRESSURE: 78 MMHG | WEIGHT: 220 LBS | BODY MASS INDEX: 27.35 KG/M2 | HEART RATE: 83 BPM | TEMPERATURE: 97.8 F

## 2024-12-11 DIAGNOSIS — G93.0 ARACHNOID CYST: ICD-10-CM

## 2024-12-11 DIAGNOSIS — Z12.5 PROSTATE CANCER SCREENING: ICD-10-CM

## 2024-12-11 DIAGNOSIS — J45.20 MILD INTERMITTENT ASTHMA WITHOUT COMPLICATION: ICD-10-CM

## 2024-12-11 DIAGNOSIS — Z00.00 ANNUAL PHYSICAL EXAM: ICD-10-CM

## 2024-12-11 DIAGNOSIS — E78.2 MIXED HYPERLIPIDEMIA: Primary | ICD-10-CM

## 2024-12-11 PROCEDURE — 36415 COLL VENOUS BLD VENIPUNCTURE: CPT | Performed by: INTERNAL MEDICINE

## 2024-12-11 PROCEDURE — 99396 PREV VISIT EST AGE 40-64: CPT | Performed by: INTERNAL MEDICINE

## 2024-12-11 NOTE — PROGRESS NOTES
Shira Messina is a 49 y.o. male.     Chief Complaint   Patient presents with    Hyperlipidemia       Hyperlipidemia  Pertinent negatives include no chest pain or shortness of breath.      Patient here follow-up for hyperlipidemia, asthma, electrolytes she is in the brain, physical.  Has no complaints.  No headache nausea vomiting.  The following portions of the patient's history were reviewed and updated as appropriate: allergies, current medications, past family history, past medical history, past social history, past surgical history and problem list.    Review of Systems   Constitutional:  Negative for activity change, appetite change, fatigue and fever.   Eyes:  Negative for blurred vision and double vision.   Respiratory: Negative.  Negative for shortness of breath.    Cardiovascular:  Negative for chest pain, palpitations and leg swelling.   Gastrointestinal: Negative.    Neurological:  Negative for dizziness, syncope, light-headedness and headache.       No Known Allergies    Current Outpatient Medications on File Prior to Visit   Medication Sig Dispense Refill    Dupixent 300 MG/2ML solution prefilled syringe       fluticasone (FLONASE) 50 MCG/ACT nasal spray  (Patient not taking: Reported on 12/11/2024)      Symbicort 160-4.5 MCG/ACT inhaler Inhale 2 puffs 2 (Two) Times a Day. (Patient not taking: Reported on 12/11/2024)       No current facility-administered medications on file prior to visit.       Family History   Problem Relation Age of Onset    Malig Hyperthermia Neg Hx        Past Medical History:   Diagnosis Date    Asthma        Past Surgical History:   Procedure Laterality Date    COLONOSCOPY N/A 11/2/2023    Procedure: COLONOSCOPY;  Surgeon: Abdoul Yeboah MD;  Location: [facility];  Service: Gastroenterology;  Laterality: N/A;  External hemorrhoids    HAMMER TOE REPAIR      SINUS SURGERY         Social History     Socioeconomic History    Marital status:    Tobacco Use     "Smoking status: Never     Passive exposure: Never    Smokeless tobacco: Never   Vaping Use    Vaping status: Never Used   Substance and Sexual Activity    Alcohol use: No    Drug use: No    Sexual activity: Defer       Patient Active Problem List   Diagnosis    Moderate asthma with acute exacerbation    Encounter for screening for malignant neoplasm of colon    Arachnoid cyst    Mixed hyperlipidemia    Mild intermittent asthma without complication       /78   Pulse 83   Temp 97.8 °F (36.6 °C)   Resp 16   Ht 190.5 cm (75\")   Wt 99.8 kg (220 lb)   SpO2 98%   BMI 27.50 kg/m²   Body mass index is 27.5 kg/m².    Objective   Physical Exam  Constitutional:       Appearance: He is well-developed.   Eyes:      Pupils: Pupils are equal, round, and reactive to light.   Neck:      Thyroid: No thyromegaly.      Vascular: No JVD.      Trachea: No tracheal deviation.   Cardiovascular:      Rate and Rhythm: Normal rate and regular rhythm.      Heart sounds: Normal heart sounds. No murmur heard.     No friction rub. No gallop.   Pulmonary:      Effort: Pulmonary effort is normal. No respiratory distress.      Breath sounds: Normal breath sounds. No wheezing.   Abdominal:      General: Bowel sounds are normal. There is no distension.      Palpations: Abdomen is soft. There is no mass.      Tenderness: There is no abdominal tenderness. There is no guarding or rebound.      Hernia: No hernia is present.   Musculoskeletal:         General: Normal range of motion.      Cervical back: Normal range of motion and neck supple.   Lymphadenopathy:      Cervical: No cervical adenopathy.   Neurological:      General: No focal deficit present.      Mental Status: He is alert and oriented to person, place, and time. Mental status is at baseline.   Psychiatric:         Mood and Affect: Mood normal.         Behavior: Behavior normal.           Assessment & Plan   Diagnoses and all orders for this visit:    1. Mixed hyperlipidemia " (Primary)  -     CBC & Differential  -     Comprehensive Metabolic Panel  -     Lipid Panel With / Chol / HDL Ratio  -     TSH  -     PSA Screen    2. Mild intermittent asthma without complication  -     CBC & Differential  -     Comprehensive Metabolic Panel  -     Lipid Panel With / Chol / HDL Ratio  -     TSH  -     PSA Screen    3. Prostate cancer screening  -     PSA Screen    4. Arachnoid cyst  -     Cancel: MRI Brain Without Contrast; Future  -     MRI Brain Without Contrast; Future    5. Annual physical exam    Continue diet and exercise.  Continue Symbicort, patient gets from allergies.  MRI was ordered a few months ago not sure why patient did not get it.  I have ordered MRI of the brain again.  Diet and exercise.  Continue current medication.  Discussed with patient getting vaccinations.  Patient thinks he got some of them he will check it.  I will see him back after MRI is done.  EHR dragon/transcription disclaimer:  Part of this note are created by electronic transcription/translation of spoken language to printed text and thus may lead to erroneous, or at times, nonsensical words or phrases inadvertently transcribed.  Although I have reviewed for such errors, some may still exist.

## 2024-12-11 NOTE — PROGRESS NOTES
Venipuncture Blood Specimen Collection  Venipuncture performed in left hand by Winnie Milligan MA with good hemostasis. Patient tolerated the procedure well without complications.   12/11/24   Winnie Milligan MA

## 2024-12-12 LAB
ALBUMIN SERPL-MCNC: 4.4 G/DL (ref 3.5–5.2)
ALBUMIN/GLOB SERPL: 1.1 G/DL
ALP SERPL-CCNC: 64 U/L (ref 39–117)
ALT SERPL-CCNC: 24 U/L (ref 1–41)
AST SERPL-CCNC: 20 U/L (ref 1–40)
BASOPHILS # BLD AUTO: 0.08 10*3/MM3 (ref 0–0.2)
BASOPHILS NFR BLD AUTO: 1.2 % (ref 0–1.5)
BILIRUB SERPL-MCNC: 0.6 MG/DL (ref 0–1.2)
BUN SERPL-MCNC: 14 MG/DL (ref 6–20)
BUN/CREAT SERPL: 14.6 (ref 7–25)
CALCIUM SERPL-MCNC: 9.4 MG/DL (ref 8.6–10.5)
CHLORIDE SERPL-SCNC: 101 MMOL/L (ref 98–107)
CHOLEST SERPL-MCNC: 175 MG/DL (ref 0–200)
CHOLEST/HDLC SERPL: 4.38 {RATIO}
CO2 SERPL-SCNC: 23.5 MMOL/L (ref 22–29)
CREAT SERPL-MCNC: 0.96 MG/DL (ref 0.76–1.27)
EGFRCR SERPLBLD CKD-EPI 2021: 96.3 ML/MIN/1.73
EOSINOPHIL # BLD AUTO: 0.83 10*3/MM3 (ref 0–0.4)
EOSINOPHIL NFR BLD AUTO: 12.2 % (ref 0.3–6.2)
ERYTHROCYTE [DISTWIDTH] IN BLOOD BY AUTOMATED COUNT: 12.5 % (ref 12.3–15.4)
GLOBULIN SER CALC-MCNC: 3.9 GM/DL
GLUCOSE SERPL-MCNC: 101 MG/DL (ref 65–99)
HCT VFR BLD AUTO: 47.2 % (ref 37.5–51)
HDLC SERPL-MCNC: 40 MG/DL (ref 40–60)
HGB BLD-MCNC: 15.5 G/DL (ref 13–17.7)
IMM GRANULOCYTES # BLD AUTO: 0.01 10*3/MM3 (ref 0–0.05)
IMM GRANULOCYTES NFR BLD AUTO: 0.1 % (ref 0–0.5)
LDLC SERPL CALC-MCNC: 122 MG/DL (ref 0–100)
LYMPHOCYTES # BLD AUTO: 2.63 10*3/MM3 (ref 0.7–3.1)
LYMPHOCYTES NFR BLD AUTO: 38.5 % (ref 19.6–45.3)
MCH RBC QN AUTO: 28.5 PG (ref 26.6–33)
MCHC RBC AUTO-ENTMCNC: 32.8 G/DL (ref 31.5–35.7)
MCV RBC AUTO: 86.9 FL (ref 79–97)
MONOCYTES # BLD AUTO: 0.61 10*3/MM3 (ref 0.1–0.9)
MONOCYTES NFR BLD AUTO: 8.9 % (ref 5–12)
NEUTROPHILS # BLD AUTO: 2.67 10*3/MM3 (ref 1.7–7)
NEUTROPHILS NFR BLD AUTO: 39.1 % (ref 42.7–76)
NRBC BLD AUTO-RTO: 0 /100 WBC (ref 0–0.2)
PLATELET # BLD AUTO: 336 10*3/MM3 (ref 140–450)
POTASSIUM SERPL-SCNC: 4.1 MMOL/L (ref 3.5–5.2)
PROT SERPL-MCNC: 8.3 G/DL (ref 6–8.5)
PSA SERPL-MCNC: 1.24 NG/ML (ref 0–4)
RBC # BLD AUTO: 5.43 10*6/MM3 (ref 4.14–5.8)
SODIUM SERPL-SCNC: 137 MMOL/L (ref 136–145)
TRIGL SERPL-MCNC: 70 MG/DL (ref 0–150)
TSH SERPL DL<=0.005 MIU/L-ACNC: 0.72 UIU/ML (ref 0.27–4.2)
VLDLC SERPL CALC-MCNC: 13 MG/DL (ref 5–40)
WBC # BLD AUTO: 6.83 10*3/MM3 (ref 3.4–10.8)

## 2025-01-29 ENCOUNTER — OFFICE VISIT (OUTPATIENT)
Dept: FAMILY MEDICINE CLINIC | Facility: CLINIC | Age: 50
End: 2025-01-29
Payer: MEDICAID

## 2025-01-29 VITALS
DIASTOLIC BLOOD PRESSURE: 68 MMHG | WEIGHT: 225 LBS | SYSTOLIC BLOOD PRESSURE: 118 MMHG | TEMPERATURE: 97.8 F | HEIGHT: 75 IN | OXYGEN SATURATION: 97 % | HEART RATE: 100 BPM | BODY MASS INDEX: 27.98 KG/M2

## 2025-01-29 DIAGNOSIS — G93.0 ARACHNOID CYST: Primary | ICD-10-CM

## 2025-01-29 DIAGNOSIS — E78.2 MIXED HYPERLIPIDEMIA: ICD-10-CM

## 2025-01-29 PROCEDURE — 99213 OFFICE O/P EST LOW 20 MIN: CPT | Performed by: INTERNAL MEDICINE

## 2025-01-29 NOTE — PROGRESS NOTES
Shira Messina is a 50 y.o. male.     Chief Complaint   Patient presents with    Results     Follow up on MRI        History of Present Illness   Patient here follow-up for MRI of the brain.  He has a arachnoid cyst.  Has no headache, vision problem.  He was incidental finding with a throat abscess.  The following portions of the patient's history were reviewed and updated as appropriate: allergies, current medications, past family history, past medical history, past social history, past surgical history and problem list.    Review of Systems   Constitutional:  Negative for activity change, appetite change, fatigue and fever.   Eyes:  Negative for blurred vision and double vision.   Respiratory: Negative.  Negative for shortness of breath.    Cardiovascular:  Negative for chest pain, palpitations and leg swelling.   Gastrointestinal: Negative.    Neurological: Negative.  Negative for dizziness, syncope and light-headedness.       No Known Allergies    Current Outpatient Medications on File Prior to Visit   Medication Sig Dispense Refill    Dupixent 300 MG/2ML solution prefilled syringe       fluticasone (FLONASE) 50 MCG/ACT nasal spray  (Patient not taking: Reported on 1/29/2025)      Symbicort 160-4.5 MCG/ACT inhaler Inhale 2 puffs 2 (Two) Times a Day. (Patient not taking: Reported on 1/29/2025)       No current facility-administered medications on file prior to visit.       Family History   Problem Relation Age of Onset    Malig Hyperthermia Neg Hx        Past Medical History:   Diagnosis Date    Asthma        Past Surgical History:   Procedure Laterality Date    COLONOSCOPY N/A 11/2/2023    Procedure: COLONOSCOPY;  Surgeon: Abdoul Yeboah MD;  Location: [facility];  Service: Gastroenterology;  Laterality: N/A;  External hemorrhoids    HAMMER TOE REPAIR      SINUS SURGERY         Social History     Socioeconomic History    Marital status:    Tobacco Use    Smoking status: Never     Passive  "exposure: Never    Smokeless tobacco: Never   Vaping Use    Vaping status: Never Used   Substance and Sexual Activity    Alcohol use: No    Drug use: No    Sexual activity: Defer       Patient Active Problem List   Diagnosis    Moderate asthma with acute exacerbation    Encounter for screening for malignant neoplasm of colon    Arachnoid cyst    Mixed hyperlipidemia    Mild intermittent asthma without complication       /68 (BP Location: Left arm, Patient Position: Sitting, Cuff Size: Adult)   Pulse 100   Temp 97.8 °F (36.6 °C) (Temporal)   Ht 190.5 cm (75\")   Wt 102 kg (225 lb)   SpO2 97%   BMI 28.12 kg/m²   Body mass index is 28.12 kg/m².    Objective   Physical Exam  Vitals and nursing note reviewed.   Constitutional:       Appearance: He is well-developed.   Eyes:      Extraocular Movements: Extraocular movements intact.      Pupils: Pupils are equal, round, and reactive to light.   Neck:      Thyroid: No thyromegaly.      Vascular: No JVD.      Trachea: No tracheal deviation.   Cardiovascular:      Rate and Rhythm: Normal rate and regular rhythm.      Heart sounds: Normal heart sounds. No murmur heard.     No friction rub. No gallop.   Pulmonary:      Effort: Pulmonary effort is normal. No respiratory distress.      Breath sounds: Normal breath sounds. No wheezing.   Abdominal:      General: Bowel sounds are normal. There is no distension.      Palpations: Abdomen is soft. There is no mass.      Tenderness: There is no abdominal tenderness. There is no guarding or rebound.      Hernia: No hernia is present.   Musculoskeletal:         General: Normal range of motion.      Cervical back: Normal range of motion and neck supple.   Lymphadenopathy:      Cervical: No cervical adenopathy.   Neurological:      General: No focal deficit present.      Mental Status: He is alert and oriented to person, place, and time. Mental status is at baseline.      Cranial Nerves: No cranial nerve deficit.      Sensory: " No sensory deficit.      Motor: No weakness.      Coordination: Coordination normal.      Gait: Gait normal.      Deep Tendon Reflexes: Reflexes normal.   Psychiatric:         Behavior: Behavior normal.           Assessment & Plan   Diagnoses and all orders for this visit:    1. Arachnoid cyst (Primary)  -     MRI Brain Without Contrast; Future    2. Mixed hyperlipidemia    Discussed with patient MRI results.  There are couple things radiologist mention nonspecific recommending CT of the head and a CT angiogram of the brain.  He had CT angiogram of the neck and the head last year.  Will repeat MRI next year.  Discussed with patient patient does not want any further testing at this time.  Gave MRI report copy.  I will see him back in a years time.  MRI ordered for 1 year.  EHR dragon/transcription disclaimer:  Part of this note are created by electronic transcription/translation of spoken language to printed text and thus may lead to erroneous, or at times, nonsensical words or phrases inadvertently transcribed.  Although I have reviewed for such errors, some may still exist.

## 2025-06-02 ENCOUNTER — HOSPITAL ENCOUNTER (EMERGENCY)
Facility: HOSPITAL | Age: 50
Discharge: HOME | End: 2025-06-02
Attending: EMERGENCY MEDICINE | Admitting: EMERGENCY MEDICINE
Payer: MEDICAID

## 2025-06-02 VITALS
SYSTOLIC BLOOD PRESSURE: 122 MMHG | RESPIRATION RATE: 16 BRPM | OXYGEN SATURATION: 99 % | HEART RATE: 105 BPM | BODY MASS INDEX: 27.35 KG/M2 | DIASTOLIC BLOOD PRESSURE: 90 MMHG | HEIGHT: 75 IN | WEIGHT: 220 LBS | TEMPERATURE: 99 F

## 2025-06-02 DIAGNOSIS — J02.0 STREP PHARYNGITIS: Primary | ICD-10-CM

## 2025-06-02 LAB — S PYO AG THROAT QL: POSITIVE

## 2025-06-02 PROCEDURE — 63710000001 DEXAMETHASONE PER 0.25 MG: Performed by: EMERGENCY MEDICINE

## 2025-06-02 PROCEDURE — 99283 EMERGENCY DEPT VISIT LOW MDM: CPT

## 2025-06-02 PROCEDURE — 87880 STREP A ASSAY W/OPTIC: CPT | Performed by: EMERGENCY MEDICINE

## 2025-06-02 RX ORDER — AMOXICILLIN 500 MG/1
1000 CAPSULE ORAL 2 TIMES DAILY
Qty: 40 CAPSULE | Refills: 0 | Status: SHIPPED | OUTPATIENT
Start: 2025-06-02 | End: 2025-06-12

## 2025-06-02 RX ORDER — DEXAMETHASONE 4 MG/1
4 TABLET ORAL ONCE
Status: COMPLETED | OUTPATIENT
Start: 2025-06-02 | End: 2025-06-02

## 2025-06-02 RX ORDER — AMOXICILLIN 250 MG/1
500 CAPSULE ORAL ONCE
Status: COMPLETED | OUTPATIENT
Start: 2025-06-02 | End: 2025-06-02

## 2025-06-02 RX ADMIN — AMOXICILLIN 500 MG: 250 CAPSULE ORAL at 22:04

## 2025-06-02 RX ADMIN — DEXAMETHASONE 4 MG: 4 TABLET ORAL at 22:04

## 2025-06-03 NOTE — ED PROVIDER NOTES
EMERGENCY DEPARTMENT ENCOUNTER  Room Number:  HC1/E  PCP: Niya Marcum MD  Independent Historians: Patient  Date of encounter:  6/2/2025  Patient Care Team:  Niya Marcum MD as PCP - General (Internal Medicine)     HPI:  Chief Complaint: had concerns including Sore Throat.     A complete HPI/ROS/PMH/PSH/SH/FH are unobtainable due to: None    Chronic or social conditions impacting patient care (Social Determinants of Health): None    Context: Hope Messina is a 50 y.o. male with a medical history of asthma, hyperlipidemia who presents to the ED c/o acute sore throat.  Patient has had 3 to 4 days of a sore throat and painful swallowing.  No fevers, chills, nausea, vomiting or difficulty breathing.  States he had normal p.o. intake.  He denies any throat or tongue swelling.    PAST MEDICAL HISTORY  Active Ambulatory Problems     Diagnosis Date Noted    Moderate asthma with acute exacerbation 08/07/2023    Encounter for screening for malignant neoplasm of colon 10/04/2023    Arachnoid cyst 02/05/2024    Mixed hyperlipidemia 05/14/2024    Mild intermittent asthma without complication 12/11/2024     Resolved Ambulatory Problems     Diagnosis Date Noted    Moderate asthma with acute exacerbation 12/18/2014    Peritonsillar abscess 01/06/2024     Past Medical History:   Diagnosis Date    Asthma        PAST SURGICAL HISTORY  Past Surgical History:   Procedure Laterality Date    COLONOSCOPY N/A 11/2/2023    Procedure: COLONOSCOPY;  Surgeon: Abdoul Yeboah MD;  Location: [facility];  Service: Gastroenterology;  Laterality: N/A;  External hemorrhoids    HAMMER TOE REPAIR      SINUS SURGERY         FAMILY HISTORY  Family History   Problem Relation Age of Onset    Malig Hyperthermia Neg Hx        SOCIAL HISTORY  Social History     Socioeconomic History    Marital status:    Tobacco Use    Smoking status: Never     Passive exposure: Never    Smokeless tobacco: Never   Vaping Use    Vaping status: Never Used    Substance and Sexual Activity    Alcohol use: No    Drug use: No    Sexual activity: Defer       ALLERGIES  Patient has no known allergies.    REVIEW OF SYSTEMS  Review of Systems  Included in HPI  All systems reviewed and negative except for those discussed in HPI.    PHYSICAL EXAM    I have reviewed the triage vital signs and nursing notes.    ED Triage Vitals   Temp Heart Rate Resp BP SpO2   06/02/25 2049 06/02/25 2049 06/02/25 2049 06/02/25 2053 06/02/25 2049   99 °F (37.2 °C) (!) 124 14 111/82 96 %      Temp src Heart Rate Source Patient Position BP Location FiO2 (%)   -- -- -- -- --              Physical Exam  Constitutional:       General: He is not in acute distress.     Appearance: Normal appearance. He is not ill-appearing, toxic-appearing or diaphoretic.   HENT:      Head: Normocephalic and atraumatic.      Nose: Nose normal.      Mouth/Throat:      Mouth: Mucous membranes are moist. No oral lesions.      Pharynx: Oropharynx is clear. Uvula midline. Posterior oropharyngeal erythema present. No pharyngeal swelling, oropharyngeal exudate or uvula swelling.      Comments: No trismus or drooling  Eyes:      Conjunctiva/sclera: Conjunctivae normal.      Pupils: Pupils are equal, round, and reactive to light.   Cardiovascular:      Rate and Rhythm: Normal rate and regular rhythm.      Pulses: Normal pulses.   Pulmonary:      Effort: Pulmonary effort is normal.   Abdominal:      General: Abdomen is flat.      Palpations: Abdomen is soft.   Skin:     General: Skin is warm and dry.   Neurological:      General: No focal deficit present.      Mental Status: He is alert and oriented to person, place, and time. Mental status is at baseline.   Psychiatric:         Mood and Affect: Mood normal.         Behavior: Behavior normal.         Thought Content: Thought content normal.         Judgment: Judgment normal.         LAB RESULTS  Recent Results (from the past 24 hours)   Rapid Strep A Screen - Swab, Throat     Collection Time: 06/02/25  8:56 PM    Specimen: Throat; Swab   Result Value Ref Range    Strep A Ag Positive (A) Negative       RADIOLOGY  No Radiology Exams Resulted Within Past 24 Hours    MEDICATIONS GIVEN IN ER  Medications   dexAMETHasone (DECADRON) tablet 4 mg (has no administration in time range)   amoxicillin (AMOXIL) capsule 500 mg (has no administration in time range)       ORDERS PLACED DURING THIS VISIT:  Orders Placed This Encounter   Procedures    Rapid Strep A Screen - Swab, Throat       OUTPATIENT MEDICATION MANAGEMENT:  Current Facility-Administered Medications Ordered in Epic   Medication Dose Route Frequency Provider Last Rate Last Admin    amoxicillin (AMOXIL) capsule 500 mg  500 mg Oral Once Jorge Luis Lockett MD        dexAMETHasone (DECADRON) tablet 4 mg  4 mg Oral Once Jorge Luis Lockett MD         Current Outpatient Medications Ordered in Epic   Medication Sig Dispense Refill    amoxicillin (AMOXIL) 500 MG capsule Take 2 capsules by mouth 2 (Two) Times a Day for 10 days. 40 capsule 0    Dupixent 300 MG/2ML solution prefilled syringe       fluticasone (FLONASE) 50 MCG/ACT nasal spray  (Patient not taking: Reported on 1/29/2025)      Symbicort 160-4.5 MCG/ACT inhaler Inhale 2 puffs 2 (Two) Times a Day. (Patient not taking: Reported on 1/29/2025)         PROCEDURES  Procedures    PROGRESS, DATA ANALYSIS, CONSULTS, AND MEDICAL DECISION MAKING  All labs have been independently interpreted by me.  All radiology studies have been reviewed by me. All EKG's have been independently viewed and interpreted by me.  Discussion below represents my analysis of pertinent findings related to patient's condition, differential diagnosis, treatment plan and final disposition.    Differential diagnosis includes but is not limited to strep pharyngitis, viral pharyngitis, PTA    Clinical Scores:                   ED Course as of 06/02/25 2136 Mon Jun 02, 2025 2127 Strep A Ag(!): Positive [AB]   2135 MDM: Patient  presents with a sore throat for the past 3 days.  Strep positive on the swab.  Exam does not show any evidence of peritonsillar abscess, breathing difficulty or swallowing difficulty.  Given oral Decadron and first dose of amoxicillin here.  Discharged with amoxicillin for next 10 days.  Return precautions discussed. [AB]      ED Course User Index  [AB] Jorge Luis Lockett MD             AS OF 21:36 EDT VITALS:    BP - 111/82  HR - (!) 124  TEMP - 99 °F (37.2 °C)  O2 SATS - 96%    COMPLEXITY OF CARE  Admission was considered but after careful review of the patient's presentation, physical examination, diagnostic results, and response to treatment the patient may be safely discharged with outpatient follow-up.      DIAGNOSIS  Final diagnoses:   Strep pharyngitis         DISPOSITION  ED Disposition       ED Disposition   Discharge    Condition   Stable    Comment   --                Please note that portions of this document were completed with a voice recognition program.    Note Disclaimer: At [facility], we believe that sharing information builds trust and better relationships. You are receiving this note because you recently visited [facility]. It is possible you will see health information before a provider has talked with you about it. This kind of information can be easy to misunderstand. To help you fully understand what it means for your health, we urge you to discuss this note with your provider.         Jorge Luis Lockett MD  06/02/25 1013

## (undated) DEVICE — LINER SURG CANSTR SXN S/RIGD 1500CC

## (undated) DEVICE — SYR LL 3CC

## (undated) DEVICE — KT ORCA ORCAPOD DISP STRL

## (undated) DEVICE — Device

## (undated) DEVICE — SOL IRR H2O BTL 1000ML STRL

## (undated) DEVICE — ADAPT CLN BIOGUARD AIR/H2O DISP

## (undated) DEVICE — BW-412T DISP COMBO CLEANING BRUSH: Brand: SINGLE USE COMBINATION CLEANING BRUSH